# Patient Record
Sex: FEMALE | Race: WHITE | Employment: OTHER | URBAN - METROPOLITAN AREA
[De-identification: names, ages, dates, MRNs, and addresses within clinical notes are randomized per-mention and may not be internally consistent; named-entity substitution may affect disease eponyms.]

---

## 2020-05-11 ENCOUNTER — APPOINTMENT (OUTPATIENT)
Dept: GENERAL RADIOLOGY | Age: 85
DRG: 690 | End: 2020-05-11
Attending: EMERGENCY MEDICINE
Payer: MEDICARE

## 2020-05-11 ENCOUNTER — APPOINTMENT (OUTPATIENT)
Dept: CT IMAGING | Age: 85
DRG: 690 | End: 2020-05-11
Attending: EMERGENCY MEDICINE
Payer: MEDICARE

## 2020-05-11 ENCOUNTER — HOSPITAL ENCOUNTER (INPATIENT)
Age: 85
LOS: 5 days | Discharge: HOME | DRG: 690 | End: 2020-05-18
Attending: EMERGENCY MEDICINE | Admitting: FAMILY MEDICINE
Payer: MEDICARE

## 2020-05-11 DIAGNOSIS — R53.83 FATIGUE, UNSPECIFIED TYPE: ICD-10-CM

## 2020-05-11 DIAGNOSIS — R41.0 DELIRIUM: ICD-10-CM

## 2020-05-11 DIAGNOSIS — R77.8 ELEVATED TROPONIN: Primary | ICD-10-CM

## 2020-05-11 LAB
ALBUMIN SERPL-MCNC: 3.6 G/DL (ref 3.5–5)
ALBUMIN/GLOB SERPL: 0.9 {RATIO} (ref 1.1–2.2)
ALP SERPL-CCNC: 106 U/L (ref 45–117)
ALT SERPL-CCNC: 48 U/L (ref 12–78)
AMMONIA PLAS-SCNC: 20 UMOL/L
ANION GAP SERPL CALC-SCNC: 5 MMOL/L (ref 5–15)
APPEARANCE UR: CLEAR
APTT PPP: 25.2 SEC (ref 22.1–32)
ARTERIAL PATENCY WRIST A: YES
AST SERPL-CCNC: 50 U/L (ref 15–37)
BACTERIA URNS QL MICRO: ABNORMAL /HPF
BASE EXCESS BLD CALC-SCNC: 0 MMOL/L
BASOPHILS # BLD: 0 K/UL (ref 0–0.1)
BASOPHILS NFR BLD: 1 % (ref 0–1)
BDY SITE: ABNORMAL
BILIRUB SERPL-MCNC: 0.3 MG/DL (ref 0.2–1)
BILIRUB UR QL: NEGATIVE
BUN SERPL-MCNC: 27 MG/DL (ref 6–20)
BUN/CREAT SERPL: 25 (ref 12–20)
CA-I BLD-SCNC: 1.2 MMOL/L (ref 1.12–1.32)
CALCIUM SERPL-MCNC: 8.8 MG/DL (ref 8.5–10.1)
CHLORIDE SERPL-SCNC: 108 MMOL/L (ref 97–108)
CO2 SERPL-SCNC: 27 MMOL/L (ref 21–32)
COLOR UR: ABNORMAL
COMMENT, HOLDF: NORMAL
COMMENT, HOLDF: NORMAL
CREAT SERPL-MCNC: 1.08 MG/DL (ref 0.55–1.02)
D DIMER PPP FEU-MCNC: 0.67 MG/L FEU (ref 0–0.65)
DIFFERENTIAL METHOD BLD: NORMAL
EOSINOPHIL # BLD: 0.3 K/UL (ref 0–0.4)
EOSINOPHIL NFR BLD: 3 % (ref 0–7)
EPITH CASTS URNS QL MICRO: ABNORMAL /LPF
ERYTHROCYTE [DISTWIDTH] IN BLOOD BY AUTOMATED COUNT: 12.7 % (ref 11.5–14.5)
FERRITIN SERPL-MCNC: 66 NG/ML (ref 26–388)
FIBRINOGEN PPP-MCNC: 320 MG/DL (ref 200–475)
GAS FLOW.O2 O2 DELIVERY SYS: ABNORMAL L/MIN
GLOBULIN SER CALC-MCNC: 4.2 G/DL (ref 2–4)
GLUCOSE SERPL-MCNC: 93 MG/DL (ref 65–100)
GLUCOSE UR STRIP.AUTO-MCNC: NEGATIVE MG/DL
HCO3 BLD-SCNC: 25.2 MMOL/L (ref 22–26)
HCT VFR BLD AUTO: 38.9 % (ref 35–47)
HGB BLD-MCNC: 12 G/DL (ref 11.5–16)
HGB UR QL STRIP: ABNORMAL
HYALINE CASTS URNS QL MICRO: ABNORMAL /LPF (ref 0–5)
IMM GRANULOCYTES # BLD AUTO: 0 K/UL (ref 0–0.04)
IMM GRANULOCYTES NFR BLD AUTO: 0 % (ref 0–0.5)
INR PPP: 1 (ref 0.9–1.1)
KETONES UR QL STRIP.AUTO: NEGATIVE MG/DL
LDH SERPL L TO P-CCNC: 406 U/L (ref 81–246)
LEUKOCYTE ESTERASE UR QL STRIP.AUTO: NEGATIVE
LYMPHOCYTES # BLD: 1.6 K/UL (ref 0.8–3.5)
LYMPHOCYTES NFR BLD: 21 % (ref 12–49)
MCH RBC QN AUTO: 28.4 PG (ref 26–34)
MCHC RBC AUTO-ENTMCNC: 30.8 G/DL (ref 30–36.5)
MCV RBC AUTO: 92.2 FL (ref 80–99)
MONOCYTES # BLD: 0.7 K/UL (ref 0–1)
MONOCYTES NFR BLD: 9 % (ref 5–13)
NEUTS SEG # BLD: 5 K/UL (ref 1.8–8)
NEUTS SEG NFR BLD: 66 % (ref 32–75)
NITRITE UR QL STRIP.AUTO: POSITIVE
NRBC # BLD: 0 K/UL (ref 0–0.01)
NRBC BLD-RTO: 0 PER 100 WBC
O2/TOTAL GAS SETTING VFR VENT: 21 %
PCO2 BLD: 44.2 MMHG (ref 35–45)
PH BLD: 7.36 [PH] (ref 7.35–7.45)
PH UR STRIP: 5 [PH] (ref 5–8)
PLATELET # BLD AUTO: 209 K/UL (ref 150–400)
PMV BLD AUTO: 10.1 FL (ref 8.9–12.9)
PO2 BLD: 75 MMHG (ref 80–100)
POTASSIUM SERPL-SCNC: 4 MMOL/L (ref 3.5–5.1)
PROCALCITONIN SERPL-MCNC: <0.05 NG/ML
PROT SERPL-MCNC: 7.8 G/DL (ref 6.4–8.2)
PROT UR STRIP-MCNC: NEGATIVE MG/DL
PROTHROMBIN TIME: 10.3 SEC (ref 9–11.1)
RBC # BLD AUTO: 4.22 M/UL (ref 3.8–5.2)
RBC #/AREA URNS HPF: ABNORMAL /HPF (ref 0–5)
SAMPLES BEING HELD,HOLD: NORMAL
SAMPLES BEING HELD,HOLD: NORMAL
SAO2 % BLD: 94 % (ref 92–97)
SODIUM SERPL-SCNC: 140 MMOL/L (ref 136–145)
SP GR UR REFRACTOMETRY: 1.01 (ref 1–1.03)
SPECIMEN TYPE: ABNORMAL
THERAPEUTIC RANGE,PTTT: NORMAL SECS (ref 58–77)
TOTAL RESP. RATE, ITRR: 19
TROPONIN I SERPL-MCNC: 0.11 NG/ML
TROPONIN I SERPL-MCNC: 0.14 NG/ML
UR CULT HOLD, URHOLD: NORMAL
UROBILINOGEN UR QL STRIP.AUTO: 0.2 EU/DL (ref 0.2–1)
WBC # BLD AUTO: 7.6 K/UL (ref 3.6–11)
WBC URNS QL MICRO: ABNORMAL /HPF (ref 0–4)

## 2020-05-11 PROCEDURE — 85730 THROMBOPLASTIN TIME PARTIAL: CPT

## 2020-05-11 PROCEDURE — 87635 SARS-COV-2 COVID-19 AMP PRB: CPT

## 2020-05-11 PROCEDURE — 99285 EMERGENCY DEPT VISIT HI MDM: CPT

## 2020-05-11 PROCEDURE — 70450 CT HEAD/BRAIN W/O DYE: CPT

## 2020-05-11 PROCEDURE — 84145 PROCALCITONIN (PCT): CPT

## 2020-05-11 PROCEDURE — 81001 URINALYSIS AUTO W/SCOPE: CPT

## 2020-05-11 PROCEDURE — 82803 BLOOD GASES ANY COMBINATION: CPT

## 2020-05-11 PROCEDURE — 83615 LACTATE (LD) (LDH) ENZYME: CPT

## 2020-05-11 PROCEDURE — 96372 THER/PROPH/DIAG INJ SC/IM: CPT

## 2020-05-11 PROCEDURE — 80053 COMPREHEN METABOLIC PANEL: CPT

## 2020-05-11 PROCEDURE — 82140 ASSAY OF AMMONIA: CPT

## 2020-05-11 PROCEDURE — 74011000250 HC RX REV CODE- 250: Performed by: EMERGENCY MEDICINE

## 2020-05-11 PROCEDURE — 74011250636 HC RX REV CODE- 250/636: Performed by: NURSE PRACTITIONER

## 2020-05-11 PROCEDURE — 85610 PROTHROMBIN TIME: CPT

## 2020-05-11 PROCEDURE — 84484 ASSAY OF TROPONIN QUANT: CPT

## 2020-05-11 PROCEDURE — 71046 X-RAY EXAM CHEST 2 VIEWS: CPT

## 2020-05-11 PROCEDURE — 93005 ELECTROCARDIOGRAM TRACING: CPT

## 2020-05-11 PROCEDURE — 36600 WITHDRAWAL OF ARTERIAL BLOOD: CPT

## 2020-05-11 PROCEDURE — 74011250636 HC RX REV CODE- 250/636: Performed by: EMERGENCY MEDICINE

## 2020-05-11 PROCEDURE — 99218 HC RM OBSERVATION: CPT

## 2020-05-11 PROCEDURE — 85384 FIBRINOGEN ACTIVITY: CPT

## 2020-05-11 PROCEDURE — 77030019905 HC CATH URETH INTMIT MDII -A

## 2020-05-11 PROCEDURE — 85379 FIBRIN DEGRADATION QUANT: CPT

## 2020-05-11 PROCEDURE — 36415 COLL VENOUS BLD VENIPUNCTURE: CPT

## 2020-05-11 PROCEDURE — 82728 ASSAY OF FERRITIN: CPT

## 2020-05-11 PROCEDURE — 85025 COMPLETE CBC W/AUTO DIFF WBC: CPT

## 2020-05-11 RX ORDER — SODIUM CHLORIDE 0.9 % (FLUSH) 0.9 %
5-40 SYRINGE (ML) INJECTION EVERY 8 HOURS
Status: DISCONTINUED | OUTPATIENT
Start: 2020-05-11 | End: 2020-05-18 | Stop reason: HOSPADM

## 2020-05-11 RX ORDER — SODIUM CHLORIDE 0.9 % (FLUSH) 0.9 %
5-40 SYRINGE (ML) INJECTION AS NEEDED
Status: DISCONTINUED | OUTPATIENT
Start: 2020-05-11 | End: 2020-05-18 | Stop reason: HOSPADM

## 2020-05-11 RX ORDER — ACETAMINOPHEN 650 MG/1
650 SUPPOSITORY RECTAL
Status: DISCONTINUED | OUTPATIENT
Start: 2020-05-11 | End: 2020-05-18 | Stop reason: HOSPADM

## 2020-05-11 RX ORDER — SODIUM CHLORIDE, SODIUM LACTATE, POTASSIUM CHLORIDE, CALCIUM CHLORIDE 600; 310; 30; 20 MG/100ML; MG/100ML; MG/100ML; MG/100ML
50 INJECTION, SOLUTION INTRAVENOUS CONTINUOUS
Status: DISCONTINUED | OUTPATIENT
Start: 2020-05-11 | End: 2020-05-13

## 2020-05-11 RX ORDER — ACETAMINOPHEN 325 MG/1
650 TABLET ORAL
Status: DISCONTINUED | OUTPATIENT
Start: 2020-05-11 | End: 2020-05-18 | Stop reason: HOSPADM

## 2020-05-11 RX ORDER — QUETIAPINE FUMARATE 25 MG/1
25 TABLET, FILM COATED ORAL
Status: DISCONTINUED | OUTPATIENT
Start: 2020-05-11 | End: 2020-05-11

## 2020-05-11 RX ORDER — ENOXAPARIN SODIUM 100 MG/ML
30 INJECTION SUBCUTANEOUS EVERY 12 HOURS
Status: DISCONTINUED | OUTPATIENT
Start: 2020-05-11 | End: 2020-05-12

## 2020-05-11 RX ORDER — ACETAMINOPHEN 325 MG/1
650 TABLET ORAL
Status: DISCONTINUED | OUTPATIENT
Start: 2020-05-11 | End: 2020-05-12 | Stop reason: SDUPTHER

## 2020-05-11 RX ADMIN — WATER 10 MG: 1 INJECTION INTRAMUSCULAR; INTRAVENOUS; SUBCUTANEOUS at 15:54

## 2020-05-11 RX ADMIN — ENOXAPARIN SODIUM 30 MG: 40 INJECTION SUBCUTANEOUS at 23:46

## 2020-05-11 RX ADMIN — SODIUM CHLORIDE, SODIUM LACTATE, POTASSIUM CHLORIDE, AND CALCIUM CHLORIDE 75 ML/HR: 600; 310; 30; 20 INJECTION, SOLUTION INTRAVENOUS at 20:44

## 2020-05-11 RX ADMIN — Medication 10 ML: at 22:00

## 2020-05-11 NOTE — H&P
[address] [facility]  History and Physical    Primary Care Provider: Unknown, Provider  Date of Service:  5/11/2020    Subjective:     Shivam Lawler is a 82 y.o. female medical history of CAD and hypertension who apparently has had mentation issues over the past few weeks that have waxed and waned. She apparently has gotten worse over the last 24 hours. She is been acting \"bizarre\" as reported by 1 of her neighbors. She lives at [facility] which is apparently some sort of nursing home. She was found to have elevated troponin of 0.11 in the ED. EMS reports that she was ANO x4. She was agitated and trying to leave in the ED and so she was given Geodon. O2 sats 95% on room air. She is afebrile. CBC normal limits. Chemistry shows elevated creatinine 1.08, slightly elevated AST 50. No previous records to compare. Patient received Geodon in the ED and is very lethargic. She opens eyes to her name and touch and mumbles words but just states that she is tired. Attempted to call the patient's daughter Gerard William at [phone], however, there was no answer. Unable to obtain review of systems, past medical history, family history, med list at this time due to patient mentation and inability to contact patient's daughter. Per ED doctor, he spoke with patient's daughter earlier who stated patient would not want to be intubated but may want CPR and that she would look for the patient's advanced directive. Since we are unable to discuss with patient and cannot get in touch with the patient's daughter, patient will be full code at this time. Review of Systems:    Unable to obtain d/t patient mentation    Past Medical History:   Diagnosis Date    CAD (coronary artery disease)     Hypertension       History reviewed. No pertinent surgical history.   Prior to Admission medications    Not on File     Allergies   Allergen Reactions    Sulfa (Sulfonamide Antibiotics) Unknown (comments)      History reviewed. No pertinent family history. SOCIAL HISTORY:  Patient resides at nursing home. Patient ambulates independently  Smoking history: None  Alcohol history: None      Objective:       Physical Exam:   Visit Vitals  /71   Pulse 65   Temp 98.5 °F (36.9 °C)   Resp 16   SpO2 95%     General:  lethargic, cooperative, no distress, appears stated age, obese   Head:  Normocephalic, without obvious abnormality, atraumatic. Eyes:  Conjunctivae/corneas clear. Pupils equal, round, reactive to light. Extraocular movements intact. Lungs:   Diminished throughout   Heart:  Regular rate and rhythm, S1, S2 normal, no murmur, click, rub, or gallop. Abdomen:   Soft, non-tender. Bowel sounds normal. No masses. No organomegaly. Extremities: Extremities normal, atraumatic, no cyanosis or edema. Pulses: 2+ and symmetric all extremities. Skin: Skin color, texture, turgor normal. No rashes or lesions.    Lymph nodes: Cervical, supraclavicular, and axillary nodes normal.   Neurologic: RAKESH d/t mentation; clear speech when she does answer, CALDERÓN spontaneously       ECG: Normal sinus rhythm   Septal infarct , age undetermined   No previous ECGs available     Data Review:   Recent Results (from the past 24 hour(s))   EKG, 12 LEAD, INITIAL    Collection Time: 05/11/20  1:19 PM   Result Value Ref Range    Ventricular Rate 69 BPM    Atrial Rate 69 BPM    P-R Interval 160 ms    QRS Duration 78 ms    Q-T Interval 396 ms    QTC Calculation (Bezet) 424 ms    Calculated P Axis 7 degrees    Calculated R Axis 9 degrees    Calculated T Axis 40 degrees    Diagnosis       Normal sinus rhythm  Septal infarct , age undetermined  No previous ECGs available     CBC WITH AUTOMATED DIFF    Collection Time: 05/11/20  1:28 PM   Result Value Ref Range    WBC 7.6 3.6 - 11.0 K/uL    RBC 4.22 3.80 - 5.20 M/uL    HGB 12.0 11.5 - 16.0 g/dL    HCT 38.9 35.0 - 47.0 %    MCV 92.2 80.0 - 99.0 FL    MCH 28.4 26.0 - 34.0 PG MCHC 30.8 30.0 - 36.5 g/dL    RDW 12.7 11.5 - 14.5 %    PLATELET 915 183 - 990 K/uL    MPV 10.1 8.9 - 12.9 FL    NRBC 0.0 0  WBC    ABSOLUTE NRBC 0.00 0.00 - 0.01 K/uL    NEUTROPHILS 66 32 - 75 %    LYMPHOCYTES 21 12 - 49 %    MONOCYTES 9 5 - 13 %    EOSINOPHILS 3 0 - 7 %    BASOPHILS 1 0 - 1 %    IMMATURE GRANULOCYTES 0 0.0 - 0.5 %    ABS. NEUTROPHILS 5.0 1.8 - 8.0 K/UL    ABS. LYMPHOCYTES 1.6 0.8 - 3.5 K/UL    ABS. MONOCYTES 0.7 0.0 - 1.0 K/UL    ABS. EOSINOPHILS 0.3 0.0 - 0.4 K/UL    ABS. BASOPHILS 0.0 0.0 - 0.1 K/UL    ABS. IMM. GRANS. 0.0 0.00 - 0.04 K/UL    DF AUTOMATED     METABOLIC PANEL, COMPREHENSIVE    Collection Time: 05/11/20  1:28 PM   Result Value Ref Range    Sodium 140 136 - 145 mmol/L    Potassium 4.0 3.5 - 5.1 mmol/L    Chloride 108 97 - 108 mmol/L    CO2 27 21 - 32 mmol/L    Anion gap 5 5 - 15 mmol/L    Glucose 93 65 - 100 mg/dL    BUN 27 (H) 6 - 20 MG/DL    Creatinine 1.08 (H) 0.55 - 1.02 MG/DL    BUN/Creatinine ratio 25 (H) 12 - 20      GFR est AA 59 (L) >60 ml/min/1.73m2    GFR est non-AA 48 (L) >60 ml/min/1.73m2    Calcium 8.8 8.5 - 10.1 MG/DL    Bilirubin, total 0.3 0.2 - 1.0 MG/DL    ALT (SGPT) 48 12 - 78 U/L    AST (SGOT) 50 (H) 15 - 37 U/L    Alk. phosphatase 106 45 - 117 U/L    Protein, total 7.8 6.4 - 8.2 g/dL    Albumin 3.6 3.5 - 5.0 g/dL    Globulin 4.2 (H) 2.0 - 4.0 g/dL    A-G Ratio 0.9 (L) 1.1 - 2.2     SAMPLES BEING HELD    Collection Time: 05/11/20  1:28 PM   Result Value Ref Range    SAMPLES BEING HELD 1RED 1BLU     COMMENT        Add-on orders for these samples will be processed based on acceptable specimen integrity and analyte stability, which may vary by analyte.    TROPONIN I    Collection Time: 05/11/20  1:28 PM   Result Value Ref Range    Troponin-I, Qt. 0.11 (H) <0.05 ng/mL   URINALYSIS W/MICROSCOPIC    Collection Time: 05/11/20  5:28 PM   Result Value Ref Range    Color YELLOW/STRAW      Appearance CLEAR CLEAR      Specific gravity 1.014 1.003 - 1.030      pH (UA) 5.0 5.0 - 8.0      Protein Negative NEG mg/dL    Glucose Negative NEG mg/dL    Ketone Negative NEG mg/dL    Bilirubin Negative NEG      Blood MODERATE (A) NEG      Urobilinogen 0.2 0.2 - 1.0 EU/dL    Nitrites Positive (A) NEG      Leukocyte Esterase Negative NEG      WBC 0-4 0 - 4 /hpf    RBC  0 - 5 /hpf    Epithelial cells FEW FEW /lpf    Bacteria 4+ (A) NEG /hpf    Hyaline cast 0-2 0 - 5 /lpf   URINE CULTURE HOLD SAMPLE    Collection Time: 05/11/20  5:28 PM   Result Value Ref Range    Urine culture hold        Urine on hold in Microbiology dept for 2 days. If unpreserved urine is submitted, it cannot be used for addtional testing after 24 hours, recollection will be required. Chest x-ray: pending    CT Results  (Last 48 hours)               05/11/20 1648  CT HEAD WO CONT Final result    Impression:  IMPRESSION: No acute intracranial hemorrhage, mass or infarct. Narrative:  INDICATION: confusion. Fatigue. Exam: Noncontrast CT of the brain is performed with 5 mm collimation. CT dose reduction was achieved with the use of the standardized protocol   tailored for this examination and automatic exposure control for dose   modulation. Adaptive statistical iterative reconstruction (ASIR) was utilized. FINDINGS: There is age-appropriate diffuse cortical atrophy. There is no acute   intracranial hemorrhage, mass, mass effect or herniation. Ventricular system is   normal. The gray-white matter differentiation is well-preserved. The mastoid air   cells are well pneumatized.  The visualized paranasal sinuses are normal.                   Assessment:     Active Problems:    Elevated troponin (5/11/2020)      Hypertension ()      CAD (coronary artery disease) ()      Elevated serum creatinine ()      Elevated AST (SGOT) ()        Plan:     AMS  -Unclear source  -Head CT negative for acute processes  -CXR pending; will also send UA/culture, ammonia level, ABG  -COVID workup d/t AMS and living in nursing home    Elevated troponin: hx CAD  -in setting of hx of CAD; home med list not yet available  -Denies CP  -Trend troponins  -Echo  -Lovenox for [facility] per covid recs  -Consult cardiology    Elevated creatinine  -Suspect d/t dehydration  -IVF  -Recheck in AM    Elevated AST  -Suspect d/t dehydration  -IVF  -Recheck in AM    COVID PUI  -O2 sats 95%  -Resides in nursing/group  azul    Attempted to call daughter, however, there was no answer.  Daughter's name is Jayne Woody and her phone number is [phone]    DVTppx: Lovenox  Gippx: NA  Code Status: Full code  Diet: Cardiac  Activity: OOB to chair TID and PRN  Discharge: TBD         Signed By: Almon Gosselin, NP     May 11, 2020

## 2020-05-11 NOTE — ED TRIAGE NOTES
Pt arrives via squad from [facility] with AMS, pt only is only c/o feeling tired, denies sob, denies fever or cough, denies pain, denies n/v, pt stated she did not asleep last night

## 2020-05-11 NOTE — ED NOTES
1500 Pt refusing cardiac monitor. Pt refusing to lay on stretcher. Pt refusing to provide urine sample    1505 Pt walking around department. Pt refusing to sit on stretcher. 1515 Pt still walking around department. Pt daughter on phone but pt refusing to follow instructions.      0 Pt assisted onto stretcher by staff

## 2020-05-11 NOTE — ED PROVIDER NOTES
HPI       82y F with hx of HTN and CAD here with concern for fatigue. I spoke with her daughter on the phone who says that for the past week or so she's been intermittently confused. Seemed worse today so nursing home called her and asked to send to the ED. Pt will not provide any information to me - says she is \"fine. \" No other history available at this time. Past Medical History:   Diagnosis Date    CAD (coronary artery disease)     Hypertension        History reviewed. No pertinent surgical history. History reviewed. No pertinent family history.     Social History     Socioeconomic History    Marital status: SINGLE     Spouse name: Not on file    Number of children: Not on file    Years of education: Not on file    Highest education level: Not on file   Occupational History    Not on file   Social Needs    Financial resource strain: Not on file    Food insecurity     Worry: Not on file     Inability: Not on file    Transportation needs     Medical: Not on file     Non-medical: Not on file   Tobacco Use    Smoking status: Not on file   Substance and Sexual Activity    Alcohol use: Not on file    Drug use: Not on file    Sexual activity: Not on file   Lifestyle    Physical activity     Days per week: Not on file     Minutes per session: Not on file    Stress: Not on file   Relationships    Social connections     Talks on phone: Not on file     Gets together: Not on file     Attends Je[employer]'s witness service: Not on file     Active member of club or organization: Not on file     Attends meetings of clubs or organizations: Not on file     Relationship status: Not on file    Intimate partner violence     Fear of current or ex partner: Not on file     Emotionally abused: Not on file     Physically abused: Not on file     Forced sexual activity: Not on file   Other Topics Concern    Not on file   Social History Narrative    Not on file         ALLERGIES: Sulfa (sulfonamide antibiotics)    Review of Systems   Review of Systems   Constitutional: (-) weight loss. HEENT: (-) stiff neck   Eyes: (-) discharge. Respiratory: (-) cough. Cardiovascular: (-) syncope. Gastrointestinal: (-) blood in stool. Genitourinary: (-) hematuria. Musculoskeletal: (-) myalgias. Neurological: (-) seizure. Skin: (-) petechiae  Lymph/Immunologic: (-) enlarged lymph nodes  All other systems reviewed and are negative. Vitals:    05/11/20 1237   BP: 133/71   Pulse: 65   Resp: 16   Temp: 98.5 °F (36.9 °C)   SpO2: 95%            Physical Exam Nursing note and vitals reviewed. Constitutional: oriented to person, place, and time. appears elderly and frail. Head: Normocephalic and atraumatic. Sclera anicteric  Nose: No rhinorrhea  Mouth/Throat: Oropharynx is clear and moist. Pharynx normal  Eyes: Conjunctivae are normal. Pupils are equal, round, and reactive to light. Right eye exhibits no discharge. Left eye exhibits no discharge. Neck: Painless normal range of motion. Neck supple. No LAD. Cardiovascular: Normal rate, regular rhythm, normal heart sounds and intact distal pulses. Exam reveals no gallop and no friction rub. No murmur heard. Pulmonary/Chest:  No respiratory distress. No wheezes. No rales. No rhonchi. No increased work of breathing. No accessory muscle use. Good air exchange throughout. Abdominal: soft, non-tender, no rebound or guarding. No hepatosplenomegaly. Normal bowel sounds throughout. Back: no tenderness to palpation, no deformities, no CVA tenderness  Extremities/Musculoskeletal: Normal range of motion. no tenderness. No edema. Distal extremities are neurovasc intact. Lymphadenopathy:   No adenopathy. Neurological:  Moves all ext on her own. Answers some questions appropriately but not others. Skin: Skin is warm and dry. No rash noted. No pallor. MDM 82y F here with confusion. Unclear etiology. Troponin is elevated. Will need admission. Spoke with daughter who is POA who agrees. Procedures      Hospitalist Conchita Serve for Admission  5:20 PM    ED Room Number: G/HG  Patient Name and age:  Elfredia Cuff 82 y.o.  female  Working Diagnosis:   1. Elevated troponin    2. Fatigue, unspecified type    3. Delirium        COVID-19 Suspicion:  no    Code Status:  Partial Code  Readmission: no  Isolation Requirements:  no  Recommended Level of Care:  telemetry  Department:[facility] - 21   Other:  82y F from nursing home with AMS and fatigue. Per daughter, has not been acting herself for a week or so but worse in past 24h. Troponin is 0.11. ECG ok. CT head ok. Labs largely ok. Urine is the only outstanding thing at this point (just obtained by straight cath) was agitated here and kept trying to leave. More calm after 10mg IM geodon. Daughter is Vandana Shukla ([phone]) who is her POA. Says she has to look thru her advanced directives but thinks she wants CPR but would not want to be intubated.

## 2020-05-12 LAB
ALBUMIN SERPL-MCNC: 2.9 G/DL (ref 3.5–5)
ALBUMIN/GLOB SERPL: 0.9 {RATIO} (ref 1.1–2.2)
ALP SERPL-CCNC: 89 U/L (ref 45–117)
ALT SERPL-CCNC: 39 U/L (ref 12–78)
ANION GAP SERPL CALC-SCNC: 3 MMOL/L (ref 5–15)
AST SERPL-CCNC: 36 U/L (ref 15–37)
ATRIAL RATE: 69 BPM
BASOPHILS # BLD: 0 K/UL (ref 0–0.1)
BASOPHILS NFR BLD: 1 % (ref 0–1)
BILIRUB SERPL-MCNC: 0.3 MG/DL (ref 0.2–1)
BUN SERPL-MCNC: 27 MG/DL (ref 6–20)
BUN/CREAT SERPL: 26 (ref 12–20)
CALCIUM SERPL-MCNC: 8.2 MG/DL (ref 8.5–10.1)
CALCULATED P AXIS, ECG09: 7 DEGREES
CALCULATED R AXIS, ECG10: 9 DEGREES
CALCULATED T AXIS, ECG11: 40 DEGREES
CHLORIDE SERPL-SCNC: 109 MMOL/L (ref 97–108)
CO2 SERPL-SCNC: 28 MMOL/L (ref 21–32)
COMMENT, HOLDF: NORMAL
CREAT SERPL-MCNC: 1.02 MG/DL (ref 0.55–1.02)
DIAGNOSIS, 93000: NORMAL
DIFFERENTIAL METHOD BLD: ABNORMAL
EOSINOPHIL # BLD: 0.2 K/UL (ref 0–0.4)
EOSINOPHIL NFR BLD: 3 % (ref 0–7)
ERYTHROCYTE [DISTWIDTH] IN BLOOD BY AUTOMATED COUNT: 12.7 % (ref 11.5–14.5)
GLOBULIN SER CALC-MCNC: 3.4 G/DL (ref 2–4)
GLUCOSE SERPL-MCNC: 132 MG/DL (ref 65–100)
HCT VFR BLD AUTO: 33.3 % (ref 35–47)
HGB BLD-MCNC: 10.4 G/DL (ref 11.5–16)
IMM GRANULOCYTES # BLD AUTO: 0 K/UL (ref 0–0.04)
IMM GRANULOCYTES NFR BLD AUTO: 0 % (ref 0–0.5)
LYMPHOCYTES # BLD: 1.2 K/UL (ref 0.8–3.5)
LYMPHOCYTES NFR BLD: 19 % (ref 12–49)
MCH RBC QN AUTO: 28.9 PG (ref 26–34)
MCHC RBC AUTO-ENTMCNC: 31.2 G/DL (ref 30–36.5)
MCV RBC AUTO: 92.5 FL (ref 80–99)
MONOCYTES # BLD: 0.6 K/UL (ref 0–1)
MONOCYTES NFR BLD: 9 % (ref 5–13)
NEUTS SEG # BLD: 4.4 K/UL (ref 1.8–8)
NEUTS SEG NFR BLD: 68 % (ref 32–75)
NRBC # BLD: 0 K/UL (ref 0–0.01)
NRBC BLD-RTO: 0 PER 100 WBC
P-R INTERVAL, ECG05: 160 MS
PLATELET # BLD AUTO: 197 K/UL (ref 150–400)
PMV BLD AUTO: 9.7 FL (ref 8.9–12.9)
POTASSIUM SERPL-SCNC: 3.9 MMOL/L (ref 3.5–5.1)
PROT SERPL-MCNC: 6.3 G/DL (ref 6.4–8.2)
Q-T INTERVAL, ECG07: 396 MS
QRS DURATION, ECG06: 78 MS
QTC CALCULATION (BEZET), ECG08: 424 MS
RBC # BLD AUTO: 3.6 M/UL (ref 3.8–5.2)
SAMPLES BEING HELD,HOLD: NORMAL
SARS-COV-2, COV2: NOT DETECTED
SODIUM SERPL-SCNC: 140 MMOL/L (ref 136–145)
SPECIMEN SOURCE, FCOV2M: NORMAL
TROPONIN I SERPL-MCNC: 0.13 NG/ML
TROPONIN I SERPL-MCNC: 0.14 NG/ML
VENTRICULAR RATE, ECG03: 69 BPM
WBC # BLD AUTO: 6.4 K/UL (ref 3.6–11)

## 2020-05-12 PROCEDURE — 87186 SC STD MICRODIL/AGAR DIL: CPT

## 2020-05-12 PROCEDURE — 80053 COMPREHEN METABOLIC PANEL: CPT

## 2020-05-12 PROCEDURE — 99218 HC RM OBSERVATION: CPT

## 2020-05-12 PROCEDURE — 74011250637 HC RX REV CODE- 250/637: Performed by: NURSE PRACTITIONER

## 2020-05-12 PROCEDURE — 74011250636 HC RX REV CODE- 250/636: Performed by: NURSE PRACTITIONER

## 2020-05-12 PROCEDURE — 87086 URINE CULTURE/COLONY COUNT: CPT

## 2020-05-12 PROCEDURE — 36415 COLL VENOUS BLD VENIPUNCTURE: CPT

## 2020-05-12 PROCEDURE — 74011250637 HC RX REV CODE- 250/637: Performed by: SPECIALIST

## 2020-05-12 PROCEDURE — 85025 COMPLETE CBC W/AUTO DIFF WBC: CPT

## 2020-05-12 PROCEDURE — 96372 THER/PROPH/DIAG INJ SC/IM: CPT

## 2020-05-12 PROCEDURE — 84484 ASSAY OF TROPONIN QUANT: CPT

## 2020-05-12 PROCEDURE — 87077 CULTURE AEROBIC IDENTIFY: CPT

## 2020-05-12 RX ORDER — ALPRAZOLAM 0.25 MG/1
0.12 TABLET ORAL
Status: DISCONTINUED | OUTPATIENT
Start: 2020-05-12 | End: 2020-05-13

## 2020-05-12 RX ORDER — VALSARTAN AND HYDROCHLOROTHIAZIDE 80; 12.5 MG/1; MG/1
0.5 TABLET, FILM COATED ORAL DAILY
COMMUNITY

## 2020-05-12 RX ORDER — METOPROLOL TARTRATE 50 MG/1
50 TABLET ORAL 2 TIMES DAILY
Status: DISCONTINUED | OUTPATIENT
Start: 2020-05-12 | End: 2020-05-12

## 2020-05-12 RX ORDER — ALENDRONATE SODIUM 70 MG/1
70 TABLET ORAL 2 TIMES WEEKLY
COMMUNITY

## 2020-05-12 RX ORDER — EZETIMIBE 10 MG/1
10 TABLET ORAL DAILY
Status: DISCONTINUED | OUTPATIENT
Start: 2020-05-13 | End: 2020-05-18 | Stop reason: HOSPADM

## 2020-05-12 RX ORDER — DOCUSATE SODIUM 100 MG/1
100 CAPSULE, LIQUID FILLED ORAL
COMMUNITY

## 2020-05-12 RX ORDER — ATORVASTATIN CALCIUM 40 MG/1
80 TABLET, FILM COATED ORAL DAILY
Status: DISCONTINUED | OUTPATIENT
Start: 2020-05-13 | End: 2020-05-18 | Stop reason: HOSPADM

## 2020-05-12 RX ORDER — POLYETHYLENE GLYCOL 400 AND PROPYLENE GLYCOL 4; 3 MG/ML; MG/ML
1 SOLUTION/ DROPS OPHTHALMIC AS NEEDED
COMMUNITY

## 2020-05-12 RX ORDER — METOPROLOL TARTRATE 25 MG/1
25 TABLET, FILM COATED ORAL 2 TIMES DAILY
Status: DISCONTINUED | OUTPATIENT
Start: 2020-05-12 | End: 2020-05-12 | Stop reason: SDUPTHER

## 2020-05-12 RX ORDER — ATORVASTATIN CALCIUM 80 MG/1
80 TABLET, FILM COATED ORAL
COMMUNITY

## 2020-05-12 RX ORDER — MIRTAZAPINE 7.5 MG/1
7.5 TABLET, FILM COATED ORAL
COMMUNITY

## 2020-05-12 RX ORDER — METOPROLOL TARTRATE 25 MG/1
25 TABLET, FILM COATED ORAL 2 TIMES DAILY
COMMUNITY

## 2020-05-12 RX ORDER — ALPRAZOLAM 0.25 MG/1
0.12 TABLET ORAL
COMMUNITY

## 2020-05-12 RX ORDER — EZETIMIBE 10 MG/1
10 TABLET ORAL DAILY
COMMUNITY

## 2020-05-12 RX ORDER — GUAIFENESIN 100 MG/5ML
81 LIQUID (ML) ORAL DAILY
COMMUNITY

## 2020-05-12 RX ORDER — CHOLECALCIFEROL (VITAMIN D3) 125 MCG
2000 CAPSULE ORAL DAILY
COMMUNITY

## 2020-05-12 RX ORDER — ESCITALOPRAM OXALATE 10 MG/1
15 TABLET ORAL DAILY
COMMUNITY
End: 2020-05-18

## 2020-05-12 RX ORDER — METOPROLOL TARTRATE 25 MG/1
25 TABLET, FILM COATED ORAL 2 TIMES DAILY
Status: DISCONTINUED | OUTPATIENT
Start: 2020-05-12 | End: 2020-05-18 | Stop reason: HOSPADM

## 2020-05-12 RX ORDER — ENOXAPARIN SODIUM 100 MG/ML
40 INJECTION SUBCUTANEOUS EVERY 12 HOURS
Status: DISCONTINUED | OUTPATIENT
Start: 2020-05-12 | End: 2020-05-13

## 2020-05-12 RX ORDER — GUAIFENESIN 100 MG/5ML
81 LIQUID (ML) ORAL DAILY
Status: DISCONTINUED | OUTPATIENT
Start: 2020-05-13 | End: 2020-05-18 | Stop reason: HOSPADM

## 2020-05-12 RX ORDER — CHOLECALCIFEROL (VITAMIN D3) 125 MCG
100 CAPSULE ORAL DAILY
COMMUNITY

## 2020-05-12 RX ADMIN — METOPROLOL TARTRATE 50 MG: 50 TABLET, FILM COATED ORAL at 01:31

## 2020-05-12 RX ADMIN — METOPROLOL TARTRATE 50 MG: 50 TABLET, FILM COATED ORAL at 08:56

## 2020-05-12 RX ADMIN — ENOXAPARIN SODIUM 30 MG: 40 INJECTION SUBCUTANEOUS at 11:39

## 2020-05-12 RX ADMIN — METOPROLOL TARTRATE 25 MG: 25 TABLET, FILM COATED ORAL at 17:36

## 2020-05-12 RX ADMIN — SODIUM CHLORIDE, SODIUM LACTATE, POTASSIUM CHLORIDE, AND CALCIUM CHLORIDE 75 ML/HR: 600; 310; 30; 20 INJECTION, SOLUTION INTRAVENOUS at 11:39

## 2020-05-12 RX ADMIN — Medication 10 ML: at 06:30

## 2020-05-12 RX ADMIN — ALPRAZOLAM 0.12 MG: 0.25 TABLET ORAL at 22:50

## 2020-05-12 RX ADMIN — Medication 10 ML: at 17:36

## 2020-05-12 NOTE — ACP (ADVANCE CARE PLANNING)
Advance Care Planning     Advance Care Planning Clinical Specialist  Conversation Note      Date of ACP Conversation: 5/11/2020    [employer] with:  Patient with capacity. ACP Clinical Specialist: Corbin Mcdaniel, DANIELA    *When Decision Maker makes decisions on behalf of the incapacitated patient: Decision Maker is asked to consider and make decisions based on patient values, known preferences, or best interests. Health Care Decision Maker:    Current Designated Health Care Decision Maker:   (If there is a valid [place] named in the [facility] Makers\" box in the ACP activity, but it is not visible above, be sure to open that field and then select the health care decision maker relationship (ie \"primary\") in the blank space to the right of the name.) Validate  this information as still accurate & up-to-date; edit [place] field as needed.)    Note: Assess and validate information in current ACP documents, as indicated. If no Decision Maker listed above or available through scanned documents, then:    If no Authorized Decision Maker has previously been identified, then patient chooses [place]:  \"Who would you like to name as your primary health care decision-maker? \"    Name: Ami Form  Relationship: Daughter Phone [phone]. \"Can this person be reached easily? \" Yes  \"Who would you like to name as your back-up decision maker? \"   Name: Any of my other 4 children    Note: If the relationship of these Decision-Makers to the patient does NOT follow your state's Next of Kin hierarchy, recommend that patient complete ACP document that meets state-specific requirements to allow them to act on the patient's behalf when appropriate. Care Preferences    Ventilation:   \"If you were in your present state of health and suddenly became very ill and were unable to breathe on your own, what would your preference be about the use of a ventilator (breathing machine) if it were available to you? \"      If patient would desire the use of a ventilator (breathing machine), answer \"yes\", if not \"no\":{ YES    \"If your health worsens and it becomes clear that your chance of recovery is unlikely, what would your preference be about the use of a ventilator (breathing machine) if it were available to you? \"     Would the patient desire the use of a ventilator (breathing machine)? YES      Resuscitation  \"CPR works best to restart the heart when there is a sudden event, like a heart attack, in someone who is otherwise healthy. Unfortunately, CPR does not typically restart the heart for people who have serious health conditions or who are very sick. \"    \"In the event your heart stopped as a result of an underlying serious health condition, would you want attempts to be made to restart your heart (answer \"yes\" for attempt to resuscitate) or would you prefer a natural death (answer \"no\" for do not attempt to resuscitate)? \" YES    NOTE: If the patient has a valid advance directive AND now provides care preference(s) that are inconsistent with that prior directive, advise the patient to consider either: creating a new advance directive that complies with state-specific requirements; or, if that is not possible, orally revoking that prior directive in accordance with state-specific requirements, which must be documented in the EHR. [] Yes  [x] No   Educated Patient / Karian Ga regarding differences between Advance Directives and portable DNR orders.     Length of ACP Conversation in minutes:      Conversation Outcomes:  [x] ACP discussion completed  [] Existing advance directive reviewed with patient; no changes to patient's previously recorded wishes   [] New Advance Directive completed   [] Portable Do Not Rescitate prepared for Provider review and signature  [] POLST/POST/MOLST/MOST prepared for Provider review and signature      Follow-up plan:    [x] Schedule follow-up conversation to continue planning  [] Referred individual to Provider for additional questions/concerns   [] Advised patient/agent/surrogate to review completed ACP document and update if needed with changes in condition, patient preferences or care setting     [] This note routed to one or more involved healthcare providers      Daughter to bring patient's AMD here to the hospital.

## 2020-05-12 NOTE — CONSULTS
Consult Note      Assessment:    Patient Active Problem List   Diagnosis Code    Elevated troponin R79.89    Hypertension I10    CAD (coronary artery disease) I25.10    Elevated serum creatinine R79.89    Elevated AST (SGOT) R74.0       Recommendations:    Echocardiogram, when feasible. Restart meds for BP. Suspect minimal troponin elevation without variation in this setting is of minor consequence. Yazan Morales MD  [phone]  Chris Yuan is a 82 y.o. female who presented 5/11/2020 with complaints of confusion and bizarre behavior. .  The symptoms began approximately 2 days ago. She has unclear history of cardiac disease including CAD, noted in chart but unable to obtain confirmation. Examination by the attending physician suggested the possibility of CAD. At present the patient has slightly improved since initial presentation. Therapy thus far has included O2. Cardiology has been consulted to assist in the management of this patient.     Current Facility-Administered Medications   Medication Dose Route Frequency    ALPRAZolam (XANAX) tablet 0.125 mg  0.125 mg Oral QHS PRN    metoprolol tartrate (LOPRESSOR) tablet 25 mg  25 mg Oral BID    enoxaparin (LOVENOX) injection 30 mg  30 mg SubCUTAneous Q12H    acetaminophen (TYLENOL) tablet 650 mg  650 mg Oral Q6H PRN    Or    acetaminophen (TYLENOL) suppository 650 mg  650 mg Rectal Q6H PRN    lactated Ringers infusion  50 mL/hr IntraVENous CONTINUOUS    sodium chloride (NS) flush 5-40 mL  5-40 mL IntraVENous Q8H    sodium chloride (NS) flush 5-40 mL  5-40 mL IntraVENous PRN    acetaminophen (TYLENOL) tablet 650 mg  650 mg Oral Q4H PRN     Past Medical History:   Diagnosis Date    CAD (coronary artery disease)     Hypertension      Patient Active Problem List   Diagnosis Code    Elevated troponin R79.89    Hypertension I10    CAD (coronary artery disease) I25.10    Elevated serum creatinine R79.89    Elevated AST (SGOT) R74.0 Allergies   Allergen Reactions    Sulfa (Sulfonamide Antibiotics) Unknown (comments)     Social History     Tobacco Use    Smoking status: Not on file   Substance Use Topics    Alcohol use: Not on file    Drug use: Not on file     History reviewed. No pertinent family history. Review of Symptoms:  Review of systems not obtained due to patient factors. Objective:      Visit Vitals  /85 (BP 1 Location: Right arm, BP Patient Position: At rest)   Pulse 60   Temp 99.2 °F (37.3 °C)   Resp 20   Ht 5' 4\" (1.626 m)   Wt 90.5 kg (199 lb 8.3 oz)   SpO2 93%   BMI 34.25 kg/m²      Physical Exam    Patient not examined due to unclear Covid status, chart reviewed, spoke with nurse. Cardiographics    Telemetry: normal sinus rhythm  ECG: normal sinus rhythm  Echocardiogram: Not done    Labs:   Recent Results (from the past 24 hour(s))   EKG, 12 LEAD, INITIAL    Collection Time: 05/11/20  1:19 PM   Result Value Ref Range    Ventricular Rate 69 BPM    Atrial Rate 69 BPM    P-R Interval 160 ms    QRS Duration 78 ms    Q-T Interval 396 ms    QTC Calculation (Bezet) 424 ms    Calculated P Axis 7 degrees    Calculated R Axis 9 degrees    Calculated T Axis 40 degrees    Diagnosis       Normal sinus rhythm  Septal infarct , age undetermined  No previous ECGs available     CBC WITH AUTOMATED DIFF    Collection Time: 05/11/20  1:28 PM   Result Value Ref Range    WBC 7.6 3.6 - 11.0 K/uL    RBC 4.22 3.80 - 5.20 M/uL    HGB 12.0 11.5 - 16.0 g/dL    HCT 38.9 35.0 - 47.0 %    MCV 92.2 80.0 - 99.0 FL    MCH 28.4 26.0 - 34.0 PG    MCHC 30.8 30.0 - 36.5 g/dL    RDW 12.7 11.5 - 14.5 %    PLATELET 538 260 - 219 K/uL    MPV 10.1 8.9 - 12.9 FL    NRBC 0.0 0  WBC    ABSOLUTE NRBC 0.00 0.00 - 0.01 K/uL    NEUTROPHILS 66 32 - 75 %    LYMPHOCYTES 21 12 - 49 %    MONOCYTES 9 5 - 13 %    EOSINOPHILS 3 0 - 7 %    BASOPHILS 1 0 - 1 %    IMMATURE GRANULOCYTES 0 0.0 - 0.5 %    ABS. NEUTROPHILS 5.0 1.8 - 8.0 K/UL    ABS.  LYMPHOCYTES 1.6 0.8 - 3.5 K/UL    ABS. MONOCYTES 0.7 0.0 - 1.0 K/UL    ABS. EOSINOPHILS 0.3 0.0 - 0.4 K/UL    ABS. BASOPHILS 0.0 0.0 - 0.1 K/UL    ABS. IMM. GRANS. 0.0 0.00 - 0.04 K/UL    DF AUTOMATED     METABOLIC PANEL, COMPREHENSIVE    Collection Time: 05/11/20  1:28 PM   Result Value Ref Range    Sodium 140 136 - 145 mmol/L    Potassium 4.0 3.5 - 5.1 mmol/L    Chloride 108 97 - 108 mmol/L    CO2 27 21 - 32 mmol/L    Anion gap 5 5 - 15 mmol/L    Glucose 93 65 - 100 mg/dL    BUN 27 (H) 6 - 20 MG/DL    Creatinine 1.08 (H) 0.55 - 1.02 MG/DL    BUN/Creatinine ratio 25 (H) 12 - 20      GFR est AA 59 (L) >60 ml/min/1.73m2    GFR est non-AA 48 (L) >60 ml/min/1.73m2    Calcium 8.8 8.5 - 10.1 MG/DL    Bilirubin, total 0.3 0.2 - 1.0 MG/DL    ALT (SGPT) 48 12 - 78 U/L    AST (SGOT) 50 (H) 15 - 37 U/L    Alk. phosphatase 106 45 - 117 U/L    Protein, total 7.8 6.4 - 8.2 g/dL    Albumin 3.6 3.5 - 5.0 g/dL    Globulin 4.2 (H) 2.0 - 4.0 g/dL    A-G Ratio 0.9 (L) 1.1 - 2.2     SAMPLES BEING HELD    Collection Time: 05/11/20  1:28 PM   Result Value Ref Range    SAMPLES BEING HELD 1RED 1BLU     COMMENT        Add-on orders for these samples will be processed based on acceptable specimen integrity and analyte stability, which may vary by analyte.    TROPONIN I    Collection Time: 05/11/20  1:28 PM   Result Value Ref Range    Troponin-I, Qt. 0.11 (H) <0.05 ng/mL   PROCALCITONIN    Collection Time: 05/11/20  1:28 PM   Result Value Ref Range    Procalcitonin <0.05 ng/mL   LD    Collection Time: 05/11/20  1:28 PM   Result Value Ref Range     (H) 81 - 246 U/L   FERRITIN    Collection Time: 05/11/20  1:28 PM   Result Value Ref Range    Ferritin 66 26 - 388 NG/ML   URINALYSIS W/MICROSCOPIC    Collection Time: 05/11/20  5:28 PM   Result Value Ref Range    Color YELLOW/STRAW      Appearance CLEAR CLEAR      Specific gravity 1.014 1.003 - 1.030      pH (UA) 5.0 5.0 - 8.0      Protein Negative NEG mg/dL    Glucose Negative NEG mg/dL    Ketone Negative NEG mg/dL    Bilirubin Negative NEG      Blood MODERATE (A) NEG      Urobilinogen 0.2 0.2 - 1.0 EU/dL    Nitrites Positive (A) NEG      Leukocyte Esterase Negative NEG      WBC 0-4 0 - 4 /hpf    RBC  0 - 5 /hpf    Epithelial cells FEW FEW /lpf    Bacteria 4+ (A) NEG /hpf    Hyaline cast 0-2 0 - 5 /lpf   URINE CULTURE HOLD SAMPLE    Collection Time: 05/11/20  5:28 PM   Result Value Ref Range    Urine culture hold        Urine on hold in Microbiology dept for 2 days. If unpreserved urine is submitted, it cannot be used for addtional testing after 24 hours, recollection will be required. POC EG7    Collection Time: 05/11/20  7:00 PM   Result Value Ref Range    Calcium, ionized (POC) 1.20 1. 12 - 1.32 mmol/L    FIO2 (POC) 21 %    pH (POC) 7.363 7.35 - 7.45      pCO2 (POC) 44.2 35.0 - 45.0 MMHG    pO2 (POC) 75 (L) 80 - 100 MMHG    HCO3 (POC) 25.2 22 - 26 MMOL/L    Base excess (POC) 0 mmol/L    sO2 (POC) 94 92 - 97 %    Site LEFT RADIAL      Device: ROOM AIR      Allens test (POC) YES      Specimen type (POC) ARTERIAL      Total resp.  rate 19     AMMONIA    Collection Time: 05/11/20  7:17 PM   Result Value Ref Range    Ammonia 20 <32 UMOL/L   TROPONIN I    Collection Time: 05/11/20  7:17 PM   Result Value Ref Range    Troponin-I, Qt. 0.14 (H) <0.05 ng/mL   D DIMER    Collection Time: 05/11/20  7:17 PM   Result Value Ref Range    D-dimer 0.67 (H) 0.00 - 0.65 mg/L FEU   FIBRINOGEN    Collection Time: 05/11/20  7:17 PM   Result Value Ref Range    Fibrinogen 320 200 - 475 mg/dL   PROTHROMBIN TIME + INR    Collection Time: 05/11/20  7:17 PM   Result Value Ref Range    INR 1.0 0.9 - 1.1      Prothrombin time 10.3 9.0 - 11.1 sec   PTT    Collection Time: 05/11/20  7:17 PM   Result Value Ref Range    aPTT 25.2 22.1 - 32.0 sec    aPTT, therapeutic range     58.0 - 77.0 SECS   SAMPLES BEING HELD    Collection Time: 05/11/20  7:17 PM   Result Value Ref Range    SAMPLES BEING HELD 1 RED, 1 BLUE     COMMENT Add-on orders for these samples will be processed based on acceptable specimen integrity and analyte stability, which may vary by analyte. SARS-COV-2    Collection Time: 05/11/20 11:44 PM   Result Value Ref Range    Specimen source Nasopharyngeal      SARS-CoV-2 PENDING    CBC WITH AUTOMATED DIFF    Collection Time: 05/12/20  1:40 AM   Result Value Ref Range    WBC 6.4 3.6 - 11.0 K/uL    RBC 3.60 (L) 3.80 - 5.20 M/uL    HGB 10.4 (L) 11.5 - 16.0 g/dL    HCT 33.3 (L) 35.0 - 47.0 %    MCV 92.5 80.0 - 99.0 FL    MCH 28.9 26.0 - 34.0 PG    MCHC 31.2 30.0 - 36.5 g/dL    RDW 12.7 11.5 - 14.5 %    PLATELET 917 838 - 854 K/uL    MPV 9.7 8.9 - 12.9 FL    NRBC 0.0 0  WBC    ABSOLUTE NRBC 0.00 0.00 - 0.01 K/uL    NEUTROPHILS 68 32 - 75 %    LYMPHOCYTES 19 12 - 49 %    MONOCYTES 9 5 - 13 %    EOSINOPHILS 3 0 - 7 %    BASOPHILS 1 0 - 1 %    IMMATURE GRANULOCYTES 0 0.0 - 0.5 %    ABS. NEUTROPHILS 4.4 1.8 - 8.0 K/UL    ABS. LYMPHOCYTES 1.2 0.8 - 3.5 K/UL    ABS. MONOCYTES 0.6 0.0 - 1.0 K/UL    ABS. EOSINOPHILS 0.2 0.0 - 0.4 K/UL    ABS. BASOPHILS 0.0 0.0 - 0.1 K/UL    ABS. IMM. GRANS. 0.0 0.00 - 0.04 K/UL    DF AUTOMATED     TROPONIN I    Collection Time: 05/12/20  1:40 AM   Result Value Ref Range    Troponin-I, Qt. 0.13 (H) <9.73 ng/mL   METABOLIC PANEL, COMPREHENSIVE    Collection Time: 05/12/20  1:40 AM   Result Value Ref Range    Sodium 140 136 - 145 mmol/L    Potassium 3.9 3.5 - 5.1 mmol/L    Chloride 109 (H) 97 - 108 mmol/L    CO2 28 21 - 32 mmol/L    Anion gap 3 (L) 5 - 15 mmol/L    Glucose 132 (H) 65 - 100 mg/dL    BUN 27 (H) 6 - 20 MG/DL    Creatinine 1.02 0.55 - 1.02 MG/DL    BUN/Creatinine ratio 26 (H) 12 - 20      GFR est AA >60 >60 ml/min/1.73m2    GFR est non-AA 52 (L) >60 ml/min/1.73m2    Calcium 8.2 (L) 8.5 - 10.1 MG/DL    Bilirubin, total 0.3 0.2 - 1.0 MG/DL    ALT (SGPT) 39 12 - 78 U/L    AST (SGOT) 36 15 - 37 U/L    Alk.  phosphatase 89 45 - 117 U/L    Protein, total 6.3 (L) 6.4 - 8.2 g/dL    Albumin 2.9 (L) 3.5 - 5.0 g/dL    Globulin 3.4 2.0 - 4.0 g/dL    A-G Ratio 0.9 (L) 1.1 - 2.2     TROPONIN I    Collection Time: 05/12/20  8:06 AM   Result Value Ref Range    Troponin-I, Qt. 0.14 (H) <0.05 ng/mL       Josefina Guerrier MD

## 2020-05-12 NOTE — PROGRESS NOTES
Chart reviewed, suspect minimal troponin elevation without variation is of no immediate concern, needs echo when possible, full note to follow

## 2020-05-12 NOTE — PROGRESS NOTES
1920: TRANSFER - IN REPORT:    Verbal report received from Chai Edis, [address] [facility] (name) on Fili Vizcaino  being received from ED (unit) for routine progression of care      Report consisted of patients Situation, Background, Assessment and   Recommendations(SBAR). Information from the following report(s) SBAR, Kardex, ED Summary, Procedure Summary, Intake/Output, MAR, Recent Results and Cardiac Rhythm NSR was reviewed with the receiving nurse. Opportunity for questions and clarification was provided. Assessment completed upon patients arrival to unit and care assumed. Jonelle Salazar RN and Lawyer Deanna RN performed a dual skin assessment on this patient and nothing of note as assessed. 0030: DAVID Jc on unit, discussed pt's elevated blood pressures. Pt has bag of medications at bedside, DAVID Jc inputted some. Med rec still needed in AM with pharmacy. Will admin metoprolol 50mg BID beginning now, 0100.

## 2020-05-12 NOTE — PROGRESS NOTES
Problem: Falls - Risk of  Goal: *Absence of Falls  Description: Document Jackeline Bhatti Fall Risk and appropriate interventions in the flowsheet.   Outcome: Progressing Towards Goal  Note: Fall Risk Interventions:  Mobility Interventions: Bed/chair exit alarm    Mentation Interventions: Adequate sleep, hydration, pain control    Medication Interventions: Patient to call before getting OOB, Teach patient to arise slowly    Elimination Interventions: Patient to call for help with toileting needs, Stay With Me (per policy)              Problem: General Medical Care Plan  Goal: *Vital signs within specified parameters  Outcome: Progressing Towards Goal  Goal: *Labs within defined limits  Outcome: Progressing Towards Goal  Goal: *Absence of infection signs and symptoms  Outcome: Progressing Towards Goal  Goal: *Optimal pain control at patient's stated goal  Outcome: Progressing Towards Goal  Goal: *Skin integrity maintained  Outcome: Progressing Towards Goal  Goal: *Fluid volume balance  Outcome: Progressing Towards Goal  Goal: *Optimize nutritional status  Outcome: Progressing Towards Goal  Goal: *Progressive mobility and function (eg: ADL's)  Outcome: Progressing Towards Goal     Problem: Patient Education: Go to Patient Education Activity  Goal: Patient/Family Education  Outcome: Progressing Towards Goal

## 2020-05-12 NOTE — PROGRESS NOTES
Problem: Falls - Risk of  Goal: *Absence of Falls  Description: Document Trino Granger Fall Risk and appropriate interventions in the flowsheet. Outcome: Progressing Towards Goal  Note: Fall Risk Interventions:  Mobility Interventions: Bed/chair exit alarm, Communicate number of staff needed for ambulation/transfer, Patient to call before getting OOB, Utilize walker, cane, or other assistive device    Mentation Interventions: Adequate sleep, hydration, pain control, Evaluate medications/consider consulting pharmacy, Increase mobility, More frequent rounding, Reorient patient, Room close to nurse's station, Toileting rounds, Update white board    Medication Interventions: Assess postural VS orthostatic hypotension, Patient to call before getting OOB, Evaluate medications/consider consulting pharmacy, Teach patient to arise slowly, Bed/chair exit alarm    Elimination Interventions: Bed/chair exit alarm, Call light in reach, Elevated toilet seat, Patient to call for help with toileting needs, Stay With Me (per policy), Toilet paper/wipes in reach, Toileting schedule/hourly rounds    Problem: General Medical Care Plan  Goal: *Labs within defined limits  Outcome: Progressing Towards Goal  Note: Troponin both trending down. Results for Sharyn Montano (MRN [id number]) as of 5/12/2020 04:55   Ref. Range 5/11/2020 13:28 5/11/2020 19:17 5/12/2020 01:40   Troponin-I, Qt. Latest Ref Range: <0.05 ng/mL 0.11 (H) 0.14 (H) 0.13 (H)     Bedside shift change report given to Yoli Grossman RN (oncoming nurse) by David Jc RN (offgoing nurse). Report included the following information SBAR, Kardex, ED Summary, Procedure Summary, Intake/Output, MAR, Recent Results and Cardiac Rhythm NSR/SB.

## 2020-05-12 NOTE — PROGRESS NOTES
TRANSITION OF CARE  Disposition--Return home to independent functioning. Resides at [place] [address]. Transportation--  Family    Note: CM advised [employer] in [address] that patient has a BC/RedT Medicare Supplement. Lobo Norris will update [employer] who can access the BC/RedT data base for identifying information. Care Management Interventions  PCP Verified by CM: Yes(JACLYN Stiles MD)  Transition of Care Consult (CM Consult): Other(none)  Discharge Durable Medical Equipment: No  Physical Therapy Consult: No  Occupational Therapy Consult: No  Speech Therapy Consult: No  Current Support Network: Lives Alone  The Plan for Transition of Care is Related to the Following Treatment Goals : return to independent functioning. Discharge Location  Discharge Placement: Home    Reason for Admission:   AMS-resolved             Elevated troponin, Creat, AST              R/O COVID 19- results pending. RUR Score:            N/A           Plan for utilizing home health:      None    PCP: First and Last name:  Susan Smith MD   Name of Practice:       [facility]/ [phone]   Are you a current patient: Yes    Approximate date of last visit:                     Current Advanced Directive/Advance Care Plan:  YES/ Daughter to bring to the hospital.                         Transition of Care Plan:    CM spoke with patient via phone due to Joe isolation. Patient lives alone at [place] [address] where she has been for about 2 years. Patient has 5 children with daughter Sumi Toledo and son Sudeep Crouch who live locally with daughter Sumi Toledo identified as the primary family contact ([phone]) . Patient reports good family /social support. Patient is ambulatory and expects return to independent functioning.  Patient confirmed PCP (CM updated chart), health insurance (has BC/RedT Medicare Supplement with card not available) , and prescription coverage. Transport at discharge will be provided by family. No discharge planning needs presented at this time. CM spoke with daughter Marija Monson  And update was given. Marija Ear will try to get patient's AMD to the hospital to be entered in the chart. Patient is currently on COVID isolation. CM explained forms via phone. CM gave patient the Patient Guide to Observation and Outpatient Care with delivery by staff nurse. --original to chart and copy to patient. CM gave patient the Medicare Outpatient Observation Notice delivered via staff nurse-- original to chart and copy to patient.

## 2020-05-13 ENCOUNTER — APPOINTMENT (OUTPATIENT)
Dept: NON INVASIVE DIAGNOSTICS | Age: 85
DRG: 690 | End: 2020-05-13
Attending: PHYSICIAN ASSISTANT
Payer: MEDICARE

## 2020-05-13 PROBLEM — N39.0 UTI (URINARY TRACT INFECTION): Status: ACTIVE | Noted: 2020-05-13

## 2020-05-13 PROCEDURE — 99218 HC RM OBSERVATION: CPT

## 2020-05-13 PROCEDURE — 65660000000 HC RM CCU STEPDOWN

## 2020-05-13 PROCEDURE — 74011000250 HC RX REV CODE- 250: Performed by: FAMILY MEDICINE

## 2020-05-13 PROCEDURE — 74011250637 HC RX REV CODE- 250/637: Performed by: HOSPITALIST

## 2020-05-13 PROCEDURE — 74011250636 HC RX REV CODE- 250/636: Performed by: FAMILY MEDICINE

## 2020-05-13 PROCEDURE — 74011250637 HC RX REV CODE- 250/637: Performed by: FAMILY MEDICINE

## 2020-05-13 PROCEDURE — 96372 THER/PROPH/DIAG INJ SC/IM: CPT

## 2020-05-13 PROCEDURE — 74011250637 HC RX REV CODE- 250/637: Performed by: SPECIALIST

## 2020-05-13 PROCEDURE — 94760 N-INVAS EAR/PLS OXIMETRY 1: CPT

## 2020-05-13 RX ORDER — ALPRAZOLAM 0.25 MG/1
0.12 TABLET ORAL
Status: DISCONTINUED | OUTPATIENT
Start: 2020-05-13 | End: 2020-05-15

## 2020-05-13 RX ADMIN — LIDOCAINE HYDROCHLORIDE 1 G: 10 INJECTION, SOLUTION EPIDURAL; INFILTRATION; INTRACAUDAL; PERINEURAL at 09:09

## 2020-05-13 RX ADMIN — ASPIRIN 81 MG 81 MG: 81 TABLET ORAL at 08:57

## 2020-05-13 RX ADMIN — METOPROLOL TARTRATE 25 MG: 25 TABLET, FILM COATED ORAL at 17:27

## 2020-05-13 RX ADMIN — METOPROLOL TARTRATE 25 MG: 25 TABLET, FILM COATED ORAL at 08:57

## 2020-05-13 RX ADMIN — Medication 10 ML: at 14:32

## 2020-05-13 RX ADMIN — EZETIMIBE 10 MG: 10 TABLET ORAL at 08:58

## 2020-05-13 RX ADMIN — ATORVASTATIN CALCIUM 80 MG: 40 TABLET, FILM COATED ORAL at 08:58

## 2020-05-13 RX ADMIN — ALPRAZOLAM 0.12 MG: 0.25 TABLET ORAL at 14:32

## 2020-05-13 NOTE — PROGRESS NOTES
Problem: Falls - Risk of  Goal: *Absence of Falls  Description: Document Hillary Eli Fall Risk and appropriate interventions in the flowsheet.   Outcome: Progressing Towards Goal  Note: Fall Risk Interventions:  Mobility Interventions: Bed/chair exit alarm, Patient to call before getting OOB    Mentation Interventions: Adequate sleep, hydration, pain control    Medication Interventions: Patient to call before getting OOB, Teach patient to arise slowly    Elimination Interventions: Patient to call for help with toileting needs              Problem: Patient Education: Go to Patient Education Activity  Goal: Patient/Family Education  Outcome: Progressing Towards Goal     Problem: General Medical Care Plan  Goal: *Vital signs within specified parameters  Outcome: Progressing Towards Goal  Goal: *Labs within defined limits  Outcome: Progressing Towards Goal  Goal: *Absence of infection signs and symptoms  Outcome: Progressing Towards Goal  Goal: *Optimal pain control at patient's stated goal  Outcome: Progressing Towards Goal  Goal: *Skin integrity maintained  Outcome: Progressing Towards Goal  Goal: *Fluid volume balance  Outcome: Progressing Towards Goal  Goal: *Optimize nutritional status  Outcome: Progressing Towards Goal  Goal: *Anxiety reduced or absent  Outcome: Progressing Towards Goal  Goal: *Progressive mobility and function (eg: ADL's)  Outcome: Progressing Towards Goal     Problem: Patient Education: Go to Patient Education Activity  Goal: Patient/Family Education  Outcome: Progressing Towards Goal

## 2020-05-13 NOTE — PROGRESS NOTES
[facility] Adult  Hospitalist Group                                                                                          Hospitalist Progress Note  Geovanna Robbins MD  Answering service: [phone] OR [phone] from in house phone        Date of Service:  2020  NAME:  Shivam Lawler  :  1935  MRN:  [id number]      Admission Summary:     Shivam Lawler is a 82 y.o. female medical history of CAD and hypertension who apparently has had mentation issues over the past few weeks that have waxed and waned. She apparently has gotten worse over the last 24 hours. She is been acting \"bizarre\" as reported by 1 of her neighbors. She lives at [facility] which is apparently some sort of nursing home. She was found to have elevated troponin of 0.11 in the ED. EMS reports that she was ANO x4. She was agitated and trying to leave in the ED and so she was given Geodon. O2 sats 95% on room air. She is afebrile. CBC normal limits. Chemistry shows elevated creatinine 1.08, slightly elevated AST 50. No previous records to compare.     Interval history / Subjective:   Patient overnight was mildly agitation and pulled out IV  Stable vitals and labs  COVID neg and we are starting her on IV abx tx for UTI  She is confused and refusing meds this am- requiring a sitter in the room     Assessment & Plan:     Acute infectious encephalopathy POA- due to UTI  -given Geodon in ER due to agitation  -Head CT negative for acute processes  PRN xanax ordered  Negative COVID testing  Started on antibiotics for UTI but had to give them IM due to loss of IV and refusal to take po meds     Elevated troponin with hx CAD  --troponin minimally elevated, no chest pain, stable BP and HR  -continue home meds aspirin, metoprolol, lipitor   -Echo ordered, EKG reviewed and unremarkable  -cardiologist consulted and following     Elevated AST  -Suspect due to dehydration  -resolved    Essential HTN  -BP elevated this am, continue metoprolol and monitor BP    HLD- resumed statin med on admission    Code status: Full Code  DVT prophylaxis: Lovenox    Care Plan discussed with: Patient/Family and Nurse  Anticipated Disposition: back to [address] [place] [address]  Anticipated Discharge: 24 hours to 48 hours     Hospital Problems  Date Reviewed: 5/12/2020          Codes Class Noted POA    Elevated troponin ICD-10-CM: R79.89  ICD-9-CM: 790.6  5/11/2020 Yes        Hypertension ICD-10-CM: I10  ICD-9-CM: 401.9  Unknown Yes        CAD (coronary artery disease) ICD-10-CM: I25.10  ICD-9-CM: 414.00  Unknown Yes        Elevated serum creatinine ICD-10-CM: R79.89  ICD-9-CM: 790.99  Unknown Yes        Elevated AST (SGOT) ICD-10-CM: R74.0  ICD-9-CM: 790.4  Unknown Yes                Vital Signs:    Last 24hrs VS reviewed since prior progress note. Most recent are:  Visit Vitals  /76 (BP 1 Location: Left arm, BP Patient Position: At rest)   Pulse 64   Temp 98 °F (36.7 °C)   Resp 16   Ht 5' 4\" (1.626 m)   Wt 88.6 kg (195 lb 5.2 oz)   SpO2 96%   BMI 33.53 kg/m²     Patient Vitals for the past 24 hrs:   Temp Pulse Resp BP SpO2   05/13/20 0314 98 °F (36.7 °C) 64 16 179/76 96 %   05/12/20 2344 97.7 °F (36.5 °C) 62 16 158/77 94 %   05/12/20 2041 99 °F (37.2 °C) 63 18 147/79 95 %   05/12/20 1900 99 °F (37.2 °C) 64 17 113/72 94 %   05/12/20 1504 99.2 °F (37.3 °C) 70 18 131/64 94 %   05/12/20 1135 99.2 °F (37.3 °C) 60 20 140/85 93 %   05/12/20 0741 97.4 °F (36.3 °C) 63 16 153/85 90 %       Intake/Output Summary (Last 24 hours) at 5/13/2020 0732  Last data filed at 5/12/2020 5603  Gross per 24 hour   Intake 240 ml   Output    Net 240 ml        Physical Examination:             Constitutional:  confused, requiring frequent redirection, constantly climbing out of bed    ENT:  Oral mucosa dry. Resp:  Decreased bronchial breath sound bilaterally but clear.  No wheezing/   CV:  Regular rhythm, normal rate, no murmurs,     GI:  Soft, non distended, non tender. normoactive bowel sounds, no hepatosplenomegaly     Musculoskeletal:  No edema     Neurologic:  Moves all extremities. CN II-XII reviewed, confused, a bit lethargic, moving very slowly     Skin:  Good turgor, no rashes or ulcers       Data Review:    Review and/or order of clinical lab test  Review and/or order of tests in the radiology section of [facility]  Review and/or order of tests in the medicine section of [facility]      Labs:     Recent Labs     05/12/20  0140 05/11/20  1328   WBC 6.4 7.6   HGB 10.4* 12.0   HCT 33.3* 38.9    209     Recent Labs     05/12/20  0140 05/11/20  1328    140   K 3.9 4.0   * 108   CO2 28 27   BUN 27* 27*   CREA 1.02 1.08*   * 93   CA 8.2* 8.8     Recent Labs     05/12/20  0140 05/11/20  1328   SGOT 36 50*   ALT 39 48   AP 89 106   TBILI 0.3 0.3   TP 6.3* 7.8   ALB 2.9* 3.6   GLOB 3.4 4.2*     Recent Labs     05/11/20  1917   INR 1.0   PTP 10.3   APTT 25.2      Recent Labs     05/11/20  1328   FERR 66      No results found for: FOL, RBCF   No results for input(s): PH, PCO2, PO2 in the last 72 hours.   Recent Labs     05/12/20  0806 05/12/20 0140 05/11/20 1917   TROIQ 0.14* 0.13* 0.14*     No results found for: CHOL, CHOLX, CHLST, CHOLV, HDL, HDLP, LDL, LDLC, DLDLP, TGLX, TRIGL, TRIGP, CHHD, CHHDX  No results found for: [facility]  Lab Results   Component Value Date/Time    Color YELLOW/STRAW 05/11/2020 05:28 PM    Appearance CLEAR 05/11/2020 05:28 PM    Specific gravity 1.014 05/11/2020 05:28 PM    pH (UA) 5.0 05/11/2020 05:28 PM    Protein Negative 05/11/2020 05:28 PM    Glucose Negative 05/11/2020 05:28 PM    Ketone Negative 05/11/2020 05:28 PM    Bilirubin Negative 05/11/2020 05:28 PM    Urobilinogen 0.2 05/11/2020 05:28 PM    Nitrites Positive (A) 05/11/2020 05:28 PM    Leukocyte Esterase Negative 05/11/2020 05:28 PM    Epithelial cells FEW 05/11/2020 05:28 PM    Bacteria 4+ (A) 05/11/2020 05:28 PM    WBC 0-4 05/11/2020 05:28 PM    RBC  05/11/2020 05:28 PM         Medications Reviewed:     Current Facility-Administered Medications   Medication Dose Route Frequency    ALPRAZolam (XANAX) tablet 0.125 mg  0.125 mg Oral QHS PRN    metoprolol tartrate (LOPRESSOR) tablet 25 mg  25 mg Oral BID    enoxaparin (LOVENOX) injection 40 mg  40 mg SubCUTAneous Q12H    aspirin chewable tablet 81 mg  81 mg Oral DAILY    ezetimibe (ZETIA) tablet 10 mg  10 mg Oral DAILY    atorvastatin (LIPITOR) tablet 80 mg  80 mg Oral DAILY    acetaminophen (TYLENOL) tablet 650 mg  650 mg Oral Q6H PRN    Or    acetaminophen (TYLENOL) suppository 650 mg  650 mg Rectal Q6H PRN    lactated Ringers infusion  50 mL/hr IntraVENous CONTINUOUS    sodium chloride (NS) flush 5-40 mL  5-40 mL IntraVENous Q8H    sodium chloride (NS) flush 5-40 mL  5-40 mL IntraVENous PRN     ______________________________________________________________________  EXPECTED LENGTH OF STAY: - - -  ACTUAL LENGTH OF STAY:          0                 Nicole Downing MD

## 2020-05-13 NOTE — PHYSICIAN ADVISORY
This patient is at high risk of adverse events and deterioration based on documented clinical data, comorbid conditions and current acute care course. Ms. Omaira Bergman is expected to meet Inpatient Admission status criteria in accordance with CMS regulation Section 43 .3. Specifically, due to medical necessity the patient's stay has exceeded Two Midnights. It is our recommendation that this patient's hospitalization status should be upgraded from  OBSERVATION to INPATIENT status. The final decision of the patient's hospitalization status depends on the attending physician's judgment.

## 2020-05-13 NOTE — PROGRESS NOTES
Cardiology Progress Note            Admit Date: 5/11/2020  Admit Diagnosis: Elevated troponin [R79.89]  UTI (urinary tract infection) [N39.0]  Elevated troponin [R79.89]  Date: 5/13/2020     Time: 11:29 AM    Subjective: Without complaints. She is not oriented. Received walking with staff. Assessment and Plan     1. Flat troponin   - asymptomatic, no changes in EKG   - she refused echo  2. AMS   - unclear source   - per Primary team  3. EVERETT   - resolving with IVF  4. UTI   - UA with 4+ bacteria - Cx? Flat troponin. Asymptomatic. Refused echo. AMS and dementia at baseline. Reviewed admission H/P - Per ED doctor, he spoke with patient's daughter earlier who stated patient would not want to be intubated but may want CPR and that she would look for the patient's advanced directive. Recommend Palliative Care consult to establish CODE status and goal of care. She needs no further cardiac testing at this time. Will see again as needed. Cardiology Attending:Patient seen and examined. I agree with NP assessment and plans. Refuses echo will sign off. Geraldine To MD 5/13/2020 1:16 PM         No results found for: IVIS   Past Medical History:   Diagnosis Date    CAD (coronary artery disease)     Hypertension       Social History     Tobacco Use    Smoking status: Not on file   Substance Use Topics    Alcohol use: Not on file    Drug use: Not on file           Review of Systems:    [] Patient unable to provide secondary to condition    [x] All systems negative, except as checked below.   Constitutional:    []Weight Change  []Fever   []Chills   []Night Sweats  []Fatigue  []Malaise  []____  ENT/Mouth:     []Hearing Changes  []Ear Pain  []Nasal Congestion   []Sinus Pain  []Hoarseness   []Sore throat  []Rhinorrhea  []Swallowing Difficulty  []____  Eyes:    []Eye Pain  []Swelling  []Redness  []Foreign Body  []Discharge  []Vision Changes  []____  Cardiovascular:    []Chest Pain  []SOB  []PND  []PADILLA  []Orthopnea  []Claudication  []Edema   []Palpitations  []____  Respiratory:    []Cough  []Sputum  []Wheezing,  []SOB  []Hemoptysis  []____  Gastrointestinal:    []Nausea  []Vomiting  []Diarrhea  []Constipation  []Pain  []Heartburn  []Anorexia  []Dysphagia  []Hematochezia  []Melena,  []Jaundice  []____  Genitourinary:    []Dysuria  []Urinary Frequency  []Hematuria  []Urinary Incontinence  []Urgency  []Flank Pain  []Hesitancy  []____  Musculoskeletal:    []Arthralgias  []Myalgias  []Joint Swelling  []Joint Stiffness  []Back Pain  []Neck Pain  []____  Skin:    []Skin Lesions  []Pruritis  []Hair Changes  []Skin rashes  []____  Neuro:    []Weakness  []Numbness  []Paresthesias  []Loss of Consciousness  []Syncope   []Dizziness  []Headache  []Coordination Changes  []Recent Falls  []____  Psych:    []Anxiety/Depression  []Insomnia  []Memory Changes  []Violence/Abuse Hx.  []____  Heme/Lymph:    []Bruising  []Bleeding  []Lymphadenopathy  []____  Endocrine:    []Polyuria  []Polydipsia  []Temperature Intolerance  []____         Objective:     Physical Exam:                Visit Vitals  /72 (BP 1 Location: Right arm, BP Patient Position: At rest)   Pulse 69   Temp 98.9 °F (37.2 °C)   Resp 18   Ht 5' 4\" (1.626 m)   Wt 195 lb 5.2 oz (88.6 kg)   SpO2 97%   BMI 33.53 kg/m²        General Appearance:   Frail,alert, and   individual in no acute distress. Ears/Nose/Mouth/Throat:    Hearing grossly normal.         Neck:  Supple. Chest:    Lungs diminished to auscultation bilaterally. Cardiovascular:   Regular rate and rhythm, S1, S2 normal, no murmur. Abdomen:    Soft, non-tender, bowel sounds are active. Extremities:  No edema bilaterally. Skin:  Warm and dry.      Telemetry: normal sinus rhythm     Data Review:    Labs:    Recent Results (from the past 24 hour(s))   SAMPLES BEING HELD    Collection Time: 05/12/20  8:10 PM   Result Value Ref Range SAMPLES BEING HELD 1UA     COMMENT        Add-on orders for these samples will be processed based on acceptable specimen integrity and analyte stability, which may vary by analyte. Radiology:        Current Facility-Administered Medications   Medication Dose Route Frequency    cefTRIAXone (ROCEPHIN) 1 g in lidocaine (PF) (XYLOCAINE) 10 mg/mL (1 %) IM injection  1 g IntraMUSCular DAILY    ALPRAZolam (XANAX) tablet 0.125 mg  0.125 mg Oral QHS PRN    metoprolol tartrate (LOPRESSOR) tablet 25 mg  25 mg Oral BID    aspirin chewable tablet 81 mg  81 mg Oral DAILY    ezetimibe (ZETIA) tablet 10 mg  10 mg Oral DAILY    atorvastatin (LIPITOR) tablet 80 mg  80 mg Oral DAILY    acetaminophen (TYLENOL) tablet 650 mg  650 mg Oral Q6H PRN    Or    acetaminophen (TYLENOL) suppository 650 mg  650 mg Rectal Q6H PRN    sodium chloride (NS) flush 5-40 mL  5-40 mL IntraVENous Q8H    sodium chloride (NS) flush 5-40 mL  5-40 mL IntraVENous PRN       Angelina Chen.  Stanley Huitron MD     [facility] [address] [place] [address], [address] [place] [address] [address] [place] [address] [facility]   [phone]

## 2020-05-13 NOTE — PROGRESS NOTES
5/13/2020 -   EDIN:  - Likely disposition includes return to [facility] with transport via family    - RUR: not available; RRAT: 7  - Patient has had periods of agitation and confusion  - Patient has a sitter  - Patient will need PT and OT consults once mentation clears some  - Patient refused Echo 5/13  - COVID-19 is negative  - ABX continue  CRM: Catherine Zhong, MPH, [address] [place] [address]; Z: [phone]

## 2020-05-13 NOTE — PROGRESS NOTES
0800: patient Refused all lab work    0900: Amaya Refused to take Ceftriaxone IM. Dr Lemos Orn. Im Ceftriaxone with Xylocaine given in gluteal tessy muscle. 0928:Refused ECHO. Informed Dr Marta Olson)  Talked with her Daughter 3 times. Updated patient condition    Bedside and Verbal shift change report given to RAE Paulson (oncoming nurse) by Blair Chino (offgoing nurse). Report included the following information SBAR, Kardex, ED Summary, Procedure Summary, Intake/Output, MAR, Recent Results and Cardiac Rhythm NSR.

## 2020-05-13 NOTE — PROGRESS NOTES
2040  Pt came to floor in recliner, she refused to get out of recliner to transport to room 357. Pt got out of chair and preceded to walk to the door to leave stating she wants to see her family. Multiple attempts to get her to bed she refused and a code atlas was called. Security helped place her back to bed with a bed alarm on. Once everyone left the room she preceded to try and get up OOB. Moved patient closer to nurses station to room 351. Placed a sitter in room pt still not cooperating. Prior to coming to [facility] patient pulled her IV out and refusing to let anyone replace it. 0300 patient awake all night still refusing IV placement and lab draws. Will continue to monitor.

## 2020-05-13 NOTE — PROGRESS NOTES
20:00  Entered room to obtain urine sample and found patient had ripped out IV line. Attempted to insert new IV, PT refused. Pt agitated and confused.

## 2020-05-13 NOTE — PROGRESS NOTES
TRANSFER - OUT REPORT:    Verbal report given to RAE Paulson(name) on Dimitrios Blaise  being transferred to 3(unit) for routine progression of care       Report consisted of patients Situation, Background, Assessment and   Recommendations(SBAR). Information from the following report(s) SBAR, Procedure Summary, Intake/Output, MAR, Recent Results and Cardiac Rhythm NSR was reviewed with the receiving nurse. Lines:   Peripheral IV 05/11/20 Left Antecubital (Active)   Site Assessment Intact;Dry;Drainage (comment) 5/12/2020  5:25 PM   Phlebitis Assessment 0 5/12/2020  5:25 PM   Infiltration Assessment 0 5/12/2020  5:25 PM   Dressing Status Clean, dry, & intact 5/12/2020  5:25 PM   Dressing Type Transparent 5/12/2020  5:25 PM   Hub Color/Line Status Pink; Infusing 5/12/2020  5:25 PM   Action Taken Open ports on tubing capped 5/12/2020  5:25 PM   Alcohol Cap Used Yes 5/12/2020  5:25 PM        Opportunity for questions and clarification was provided.       Patient transported with:  Personal belongings

## 2020-05-13 NOTE — PROGRESS NOTES
Problem: Falls - Risk of  Goal: *Absence of Falls  Description: Document Collin Henning Fall Risk and appropriate interventions in the flowsheet.   Outcome: Progressing Towards Goal  Note: Fall Risk Interventions:  Mobility Interventions: Communicate number of staff needed for ambulation/transfer, Bed/chair exit alarm    Mentation Interventions: Bed/chair exit alarm, Door open when patient unattended, Family/sitter at bedside    Medication Interventions: Bed/chair exit alarm, Evaluate medications/consider consulting pharmacy    Elimination Interventions: Bed/chair exit alarm, Call light in reach, Toileting schedule/hourly rounds

## 2020-05-13 NOTE — PROGRESS NOTES
[facility]  Hospitalist Group                                                                                          Hospitalist Progress Note  Chikis Jackson MD  Answering service: [phone] from in house phone        Date of Service:  2020  NAME:  Silvana Stephens  :  1935  MRN:  [id number]      Admission Summary:     Silvana Stephens is a 82 y.o. female medical history of CAD and hypertension who apparently has had mentation issues over the past few weeks that have waxed and waned. She apparently has gotten worse over the last 24 hours. She is been acting \"bizarre\" as reported by 1 of her neighbors. She lives at [facility] which is apparently some sort of nursing home. She was found to have elevated troponin of 0.11 in the ED. EMS reports that she was ANO x4. She was agitated and trying to leave in the ED and so she was given Geodon. O2 sats 95% on room air. She is afebrile. CBC normal limits. Chemistry shows elevated creatinine 1.08, slightly elevated AST 50. No previous records to compare.     Interval history / Subjective:     She said no fever, cough, shortness of breath or chest pain  She said she has tardive dyskinesis and tried different meds but not helping her     Assessment & Plan:     COVID-19 suspected  -afebrile, leukocytosis, SpO2 sats 95%  -chest x ray very mild left basilar linear scarring.  -Resides in nursing/group  home  -SARS COV-2 test negative, transfer to non COVID unit    Acute metabolic encephalopathy POA  -Head CT negative for acute processes  -UA abnormal, follow up urine cx     Elevated troponin with hx CAD  -no chest pain  -continue aspirin, metoprolol, lipitor   -troponin 0.14, 0.13, 0.14, initial was 0.11  -Echo pending  -cardiologist is consulted     Elevated AST  -Suspect due to dehydration  -resolved    HTN  -BP normal, continue metoprolol and monitor BP    Code status: Full Code  DVT prophylaxis: Lovenox    Care Plan discussed with: Patient/Family and Nurse  Anticipated Disposition: Home w/Family  Anticipated Discharge: 24 hours to 48 hours     Hospital Problems  Date Reviewed: 5/12/2020          Codes Class Noted POA    Elevated troponin ICD-10-CM: R79.89  ICD-9-CM: 790.6  5/11/2020 Yes        Hypertension ICD-10-CM: I10  ICD-9-CM: 401.9  Unknown Yes        CAD (coronary artery disease) ICD-10-CM: I25.10  ICD-9-CM: 414.00  Unknown Yes        Elevated serum creatinine ICD-10-CM: R79.89  ICD-9-CM: 790.99  Unknown Yes        Elevated AST (SGOT) ICD-10-CM: R74.0  ICD-9-CM: 790.4  Unknown Yes                Vital Signs:    Last 24hrs VS reviewed since prior progress note. Most recent are:  Visit Vitals  /79 (BP 1 Location: Right arm, BP Patient Position: At rest)   Pulse 63   Temp 99 °F (37.2 °C)   Resp 18   Ht 5' 4\" (1.626 m)   Wt 90.5 kg (199 lb 8.3 oz)   SpO2 95%   BMI 34.25 kg/m²         Intake/Output Summary (Last 24 hours) at 5/12/2020 2130  Last data filed at 5/12/2020 4783  Gross per 24 hour   Intake 785 ml   Output 250 ml   Net 535 ml        Physical Examination:             Constitutional:  No acute distress, cooperative, pleasant    ENT:  Oral mucosa moist, oropharynx benign. Resp:  Decrease bronchial breath sound bilaterally. No wheezing/rhonchi/rales. No accessory muscle use   CV:  Regular rhythm, normal rate, no murmurs, gallops, rubs    GI:  Soft, non distended, non tender. normoactive bowel sounds, no hepatosplenomegaly     Musculoskeletal:  No edema     Neurologic:  Moves all extremities.   AAOx3, CN II-XII reviewed     Skin:  Good turgor, no rashes or ulcers       Data Review:    Review and/or order of clinical lab test  Review and/or order of tests in the radiology section of CPT  Review and/or order of tests in the medicine section of CPT      Labs:     Recent Labs     05/12/20  0140 05/11/20  1328   WBC 6.4 7.6   HGB 10.4* 12.0   HCT 33.3* 38.9    209     Recent Labs     05/12/20  0140 05/11/20  1328    140   K 3.9 4.0   * 108   CO2 28 27   BUN 27* 27*   CREA 1.02 1.08*   * 93   CA 8.2* 8.8     Recent Labs     05/12/20  0140 05/11/20 1328   SGOT 36 50*   ALT 39 48   AP 89 106   TBILI 0.3 0.3   TP 6.3* 7.8   ALB 2.9* 3.6   GLOB 3.4 4.2*     Recent Labs     05/11/20 1917   INR 1.0   PTP 10.3   APTT 25.2      Recent Labs     05/11/20  1328   FERR 66      No results found for: FOL, RBCF   No results for input(s): PH, PCO2, PO2 in the last 72 hours.   Recent Labs     05/12/20  0806 05/12/20 0140 05/11/20 1917   TROIQ 0.14* 0.13* 0.14*     No results found for: CHOL, CHOLX, CHLST, CHOLV, HDL, HDLP, LDL, LDLC, DLDLP, TGLX, TRIGL, TRIGP, CHHD, CHHDX  No results found for: [place]  Lab Results   Component Value Date/Time    Color YELLOW/STRAW 05/11/2020 05:28 PM    Appearance CLEAR 05/11/2020 05:28 PM    Specific gravity 1.014 05/11/2020 05:28 PM    pH (UA) 5.0 05/11/2020 05:28 PM    Protein Negative 05/11/2020 05:28 PM    Glucose Negative 05/11/2020 05:28 PM    Ketone Negative 05/11/2020 05:28 PM    Bilirubin Negative 05/11/2020 05:28 PM    Urobilinogen 0.2 05/11/2020 05:28 PM    Nitrites Positive (A) 05/11/2020 05:28 PM    Leukocyte Esterase Negative 05/11/2020 05:28 PM    Epithelial cells FEW 05/11/2020 05:28 PM    Bacteria 4+ (A) 05/11/2020 05:28 PM    WBC 0-4 05/11/2020 05:28 PM    RBC  05/11/2020 05:28 PM         Medications Reviewed:     Current Facility-Administered Medications   Medication Dose Route Frequency    ALPRAZolam (XANAX) tablet 0.125 mg  0.125 mg Oral QHS PRN    metoprolol tartrate (LOPRESSOR) tablet 25 mg  25 mg Oral BID    enoxaparin (LOVENOX) injection 40 mg  40 mg SubCUTAneous Q12H    [START ON 5/13/2020] aspirin chewable tablet 81 mg  81 mg Oral DAILY    [START ON 5/13/2020] ezetimibe (ZETIA) tablet 10 mg  10 mg Oral DAILY    [START ON 5/13/2020] atorvastatin (LIPITOR) tablet 80 mg  80 mg Oral DAILY    acetaminophen (TYLENOL) tablet 650 mg  650 mg Oral Q6H PRN    Or    acetaminophen (TYLENOL) suppository 650 mg  650 mg Rectal Q6H PRN    lactated Ringers infusion  50 mL/hr IntraVENous CONTINUOUS    sodium chloride (NS) flush 5-40 mL  5-40 mL IntraVENous Q8H    sodium chloride (NS) flush 5-40 mL  5-40 mL IntraVENous PRN     ______________________________________________________________________  EXPECTED LENGTH OF STAY: - - -  ACTUAL LENGTH OF STAY:          0                 Emil Anne MD

## 2020-05-14 ENCOUNTER — APPOINTMENT (OUTPATIENT)
Dept: NON INVASIVE DIAGNOSTICS | Age: 85
DRG: 690 | End: 2020-05-14
Attending: PHYSICIAN ASSISTANT
Payer: MEDICARE

## 2020-05-14 LAB
AV VELOCITY RATIO: 0.77
ECHO AO ROOT DIAM: 3.44 CM
ECHO AV AREA PEAK VELOCITY: 3.1 CM2
ECHO AV PEAK GRADIENT: 8.4 MMHG
ECHO AV PEAK VELOCITY: 145.26 CM/S
ECHO EST RA PRESSURE: 5 MMHG
ECHO LA AREA 4C: 22.6 CM2
ECHO LA MAJOR AXIS: 2.73 CM
ECHO LA TO AORTIC ROOT RATIO: 0.79
ECHO LA VOL 2C: 85.35 ML (ref 22–52)
ECHO LA VOL 4C: 65.88 ML (ref 22–52)
ECHO LA VOL BP: 82.64 ML (ref 22–52)
ECHO LA VOL/BSA BIPLANE: 42.8 ML/M2 (ref 16–28)
ECHO LA VOLUME INDEX A2C: 44.2 ML/M2 (ref 16–28)
ECHO LA VOLUME INDEX A4C: 34.12 ML/M2 (ref 16–28)
ECHO LV E' LATERAL VELOCITY: 4.23 CM/S
ECHO LV E' SEPTAL VELOCITY: 5.3 CM/S
ECHO LV INTERNAL DIMENSION DIASTOLIC: 4.06 CM (ref 3.9–5.3)
ECHO LV INTERNAL DIMENSION SYSTOLIC: 2.92 CM
ECHO LV IVSD: 1.43 CM (ref 0.6–0.9)
ECHO LV MASS 2D: 198.6 G (ref 67–162)
ECHO LV MASS INDEX 2D: 102.9 G/M2 (ref 43–95)
ECHO LV POSTERIOR WALL DIASTOLIC: 0.98 CM (ref 0.6–0.9)
ECHO LVOT DIAM: 2.25 CM
ECHO LVOT PEAK GRADIENT: 5 MMHG
ECHO LVOT PEAK VELOCITY: 112.14 CM/S
ECHO MV A VELOCITY: 97.53 CM/S
ECHO MV AREA PHT: 3.4 CM2
ECHO MV E DECELERATION TIME (DT): 224.7 MS
ECHO MV E VELOCITY: 58.12 CM/S
ECHO MV E/A RATIO: 0.6
ECHO MV E/E' LATERAL: 13.74
ECHO MV E/E' RATIO (AVERAGED): 12.35
ECHO MV E/E' SEPTAL: 10.97
ECHO MV PRESSURE HALF TIME (PHT): 65.2 MS
ECHO PULMONARY ARTERY SYSTOLIC PRESSURE (PASP): 28.3 MMHG
ECHO PV MAX VELOCITY: 89.78 CM/S
ECHO PV PEAK GRADIENT: 3.2 MMHG
ECHO RIGHT VENTRICULAR SYSTOLIC PRESSURE (RVSP): 28.3 MMHG
ECHO RV INTERNAL DIMENSION: 3.92 CM
ECHO RV TAPSE: 1.62 CM (ref 1.5–2)
ECHO TV REGURGITANT MAX VELOCITY: 241.54 CM/S
ECHO TV REGURGITANT PEAK GRADIENT: 23.3 MMHG
LVFS 2D: 28.18 %
MV DEC SLOPE: 2.59
PULMONARY ARTERY END DIASTOLIC PRESSURE: 11 MMHG
PULMONARY ARTERY MEAN PRESURE: 16.8 MMHG
PV END DIASTOLIC VELOCITY: 1.2 MMHG

## 2020-05-14 PROCEDURE — 74011250637 HC RX REV CODE- 250/637: Performed by: HOSPITALIST

## 2020-05-14 PROCEDURE — 74011250637 HC RX REV CODE- 250/637: Performed by: SPECIALIST

## 2020-05-14 PROCEDURE — 74011000250 HC RX REV CODE- 250: Performed by: FAMILY MEDICINE

## 2020-05-14 PROCEDURE — 65660000000 HC RM CCU STEPDOWN

## 2020-05-14 PROCEDURE — 74011250636 HC RX REV CODE- 250/636: Performed by: FAMILY MEDICINE

## 2020-05-14 PROCEDURE — 93306 TTE W/DOPPLER COMPLETE: CPT

## 2020-05-14 PROCEDURE — 94760 N-INVAS EAR/PLS OXIMETRY 1: CPT

## 2020-05-14 RX ADMIN — ASPIRIN 81 MG 81 MG: 81 TABLET ORAL at 08:20

## 2020-05-14 RX ADMIN — EZETIMIBE 10 MG: 10 TABLET ORAL at 08:20

## 2020-05-14 RX ADMIN — METOPROLOL TARTRATE 25 MG: 25 TABLET, FILM COATED ORAL at 08:20

## 2020-05-14 RX ADMIN — LIDOCAINE HYDROCHLORIDE 1 G: 10 INJECTION, SOLUTION EPIDURAL; INFILTRATION; INTRACAUDAL; PERINEURAL at 08:20

## 2020-05-14 RX ADMIN — ATORVASTATIN CALCIUM 80 MG: 40 TABLET, FILM COATED ORAL at 08:20

## 2020-05-14 RX ADMIN — METOPROLOL TARTRATE 25 MG: 25 TABLET, FILM COATED ORAL at 17:27

## 2020-05-14 NOTE — PROGRESS NOTES
0900: IM Antibiotics given. Patient condition really improve than yesterday. She fallow command. she is more alert and oriented(Times 3) today. 100: She refused ECHO yesterday. ECHO done today. 0930: Switch IM antibiotics to IV. Patient Refused to put new IV. Patient does not have any Peripheral IV since 2 days. She is getting antibiotics once in 24 hours    7:45PM: Bedside and Verbal shift change report given to Ryan Nugent (oncoming nurse) by Randy Sarabia (offgoing nurse). Report included the following information SBAR, Kardex, ED Summary, Procedure Summary, Intake/Output, MAR, Recent Results and Cardiac Rhythm NSR.

## 2020-05-14 NOTE — PROGRESS NOTES
Problem: Falls - Risk of  Goal: *Absence of Falls  Description: Document Ravi Javier Fall Risk and appropriate interventions in the flowsheet.   Outcome: Progressing Towards Goal  Note: Fall Risk Interventions:  Mobility Interventions: Bed/chair exit alarm, Assess mobility with egress test, Communicate number of staff needed for ambulation/transfer    Mentation Interventions: Bed/chair exit alarm, Toileting rounds, Family/sitter at bedside    Medication Interventions: Patient to call before getting OOB, Bed/chair exit alarm    Elimination Interventions: Call light in reach, Toileting schedule/hourly rounds

## 2020-05-14 NOTE — PROGRESS NOTES
Verbal shift change report given to NELLY Guardado RN (oncoming nurse) by AMISH Reyna, RAE (offgoing nurse). Report included the following information SBAR.     5/14/2020 @ 0100: patient refusing to have rn complete assessment, did allow vital signs to be taken. @6717: upon awakening patient for vital signs/labs/assessment, pt confused and upset. Refusing to have vital signs and blood drawn as well as assessment performed, \"I just want to go back to sleep\". Per report, this is patient's baseline and is non-compliant. Will attempt to get blood work during next rounds. @5318: patient continues to refuse lab draws and vital signs. States, \"I might do it later\". @4048: Bedside shift change report given to DEBO Smith RN (oncoming nurse) by NELLY Guardado RN (offgoing nurse). Report included the following information SBAR.

## 2020-05-14 NOTE — PROGRESS NOTES
Problem: Falls - Risk of  Goal: *Absence of Falls  Description: Document Beatrice Cullen Fall Risk and appropriate interventions in the flowsheet.   Outcome: Progressing Towards Goal  Note: Fall Risk Interventions:  Mobility Interventions: Bed/chair exit alarm, Assess mobility with egress test, Communicate number of staff needed for ambulation/transfer    Mentation Interventions: Bed/chair exit alarm, Evaluate medications/consider consulting pharmacy, Family/sitter at bedside    Medication Interventions: Evaluate medications/consider consulting pharmacy    Elimination Interventions: Bed/chair exit alarm, Toileting schedule/hourly rounds              Problem: Patient Education: Go to Patient Education Activity  Goal: Patient/Family Education  Outcome: Progressing Towards Goal     Problem: General Medical Care Plan  Goal: *Vital signs within specified parameters  Outcome: Progressing Towards Goal  Goal: *Labs within defined limits  Outcome: Progressing Towards Goal  Goal: *Absence of infection signs and symptoms  Outcome: Progressing Towards Goal  Goal: *Optimal pain control at patient's stated goal  Outcome: Progressing Towards Goal  Goal: *Skin integrity maintained  Outcome: Progressing Towards Goal  Goal: *Fluid volume balance  Outcome: Progressing Towards Goal  Goal: *Optimize nutritional status  Outcome: Progressing Towards Goal  Goal: *Anxiety reduced or absent  Outcome: Progressing Towards Goal  Goal: *Progressive mobility and function (eg: ADL's)  Outcome: Progressing Towards Goal     Problem: Patient Education: Go to Patient Education Activity  Goal: Patient/Family Education  Outcome: Progressing Towards Goal

## 2020-05-14 NOTE — PROGRESS NOTES
[facility] [facility] Adult  Hospitalist Group                                                                                          Hospitalist Progress Note  Darien De Guzman MD  Answering service: [phone] OR [phone] from in house phone        Date of Service:  2020  NAME:  Marya Kim  :  1935  MRN:  [id number]    Admission Summary:   Marya Kim is a 82 y.o. female medical history of CAD and hypertension who apparently has had mentation issues over the past few weeks that have waxed and waned. She apparently has gotten worse over the last 24 hours. She is been acting \"bizarre\" as reported by 1 of her neighbors. She lives at [facility] which is apparently some sort of nursing home. She was found to have elevated troponin of 0.11 in the ED. EMS reports that she was ANO x4. She was agitated and trying to leave in the ED and so she was given Geodon. O2 sats 95% on room air. She is afebrile. CBC normal limits. Chemistry shows elevated creatinine 1.08, slightly elevated AST 50. No previous records to compare. Interval history / Subjective:   Patient feels ok, appears alert, oriented this morning, will try iv line.  Urine cultures still pending, will get PT/OT eval     Assessment & Plan:     Acute infectious encephalopathy POA- due to UTI- Urine cultures GNR  -given Geodon in ER due to agitation-Head CT negative for acute processes  PRN xanax ordered- Negative COVID testing  Started Abx for UTI but had to give them IM due to loss of IV and refusal to take po meds  - PT/OT eval    Elevated troponin with hx CAD  --troponin minimally elevated, no chest pain, stable BP and HR  -continue home meds aspirin, metoprolol, lipitor   -cardiologist following-Echo ordered, EKG reviewed and unremarkable     Elevated AST -Suspect due to dehydration -resolved  Essential HTN -BP elevated this am, continue metoprolol and monitor BP  HLD- resumed statin med on admission    Code status: Full Code  DVT prophylaxis: Lovenox  Care Plan discussed with: Patient/Family and Nurse  Anticipated Disposition: back to [address] [place] [address]  Anticipated Discharge: 24 hours to 50 hours     Hospital Problems  Date Reviewed: 5/12/2020          Codes Class Noted POA    UTI (urinary tract infection) ICD-10-CM: N39.0  ICD-9-CM: 599.0  5/13/2020 Unknown        Elevated troponin ICD-10-CM: R79.89  ICD-9-CM: 790.6  5/11/2020 Yes        Hypertension ICD-10-CM: G21  ICD-9-CM: 401.9  Unknown Yes        CAD (coronary artery disease) ICD-10-CM: I25.10  ICD-9-CM: 414.00  Unknown Yes        Elevated serum creatinine ICD-10-CM: R79.89  ICD-9-CM: 790.99  Unknown Yes        Elevated AST (SGOT) ICD-10-CM: R74.0  ICD-9-CM: 790.4  Unknown Yes                Vital Signs:    Last 24hrs VS reviewed since prior progress note. Most recent are:  Visit Vitals  /76 (BP 1 Location: Left arm, BP Patient Position: At rest)   Pulse 63   Temp 98.2 °F (36.8 °C)   Resp 16   Ht 5' 4\" (1.626 m)   Wt 88.2 kg (194 lb 7.1 oz)   SpO2 94%   BMI 33.38 kg/m²     Patient Vitals for the past 24 hrs:   Temp Pulse Resp BP SpO2   05/14/20 0810 98.2 °F (36.8 °C) 63 16 132/76 94 %   05/14/20 0349  (!) 56      05/14/20 0107 98 °F (36.7 °C) 75 18 131/84 95 %   05/13/20 1945 97.5 °F (36.4 °C) 72 20 133/84 94 %   05/13/20 1631 98.5 °F (36.9 °C) 83 18 130/85 94 %   05/13/20 1501 96.4 °F (35.8 °C) 95 18 127/73 95 %   05/13/20 1320  75 20 158/89 95 %     No intake or output data in the 24 hours ending 05/14/20 9401     Physical Examination:             Constitutional:  confused, requiring frequent redirection, constantly climbing out of bed        Resp:  Decreased bronchial breath sound bilaterally but clear. No wheezing/   CV:  Regular rhythm, normal rate, no murmurs,     GI:  Soft, non distended, non tender. Musculoskeletal:  No edema     Neurologic:  Moves all extremities.  Follows commands     Skin:  Good turgor, no rashes or ulcers       Data Review:    Review and/or order of clinical lab test  Review and/or order of tests in the radiology section of CPT  Review and/or order of tests in the medicine section of [facility]      Labs:     Recent Labs     05/12/20  0140 05/11/20  1328   WBC 6.4 7.6   HGB 10.4* 12.0   HCT 33.3* 38.9    209     Recent Labs     05/12/20  0140 05/11/20  1328    140   K 3.9 4.0   * 108   CO2 28 27   BUN 27* 27*   CREA 1.02 1.08*   * 93   CA 8.2* 8.8     Recent Labs     05/12/20  0140 05/11/20  1328   SGOT 36 50*   ALT 39 48   AP 89 106   TBILI 0.3 0.3   TP 6.3* 7.8   ALB 2.9* 3.6   GLOB 3.4 4.2*     Recent Labs     05/11/20  1917   INR 1.0   PTP 10.3   APTT 25.2      Recent Labs     05/11/20  1328   FERR 66      No results found for: FOL, RBCF   No results for input(s): PH, PCO2, PO2 in the last 72 hours.   Recent Labs     05/12/20  0806 05/12/20  0140 05/11/20  1917   TROIQ 0.14* 0.13* 0.14*     No results found for: CHOL, CHOLX, CHLST, CHOLV, HDL, HDLP, LDL, LDLC, DLDLP, TGLX, TRIGL, TRIGP, CHHD, CHHDX  No results found for: [facility]  Lab Results   Component Value Date/Time    Color YELLOW/STRAW 05/11/2020 05:28 PM    Appearance CLEAR 05/11/2020 05:28 PM    Specific gravity 1.014 05/11/2020 05:28 PM    pH (UA) 5.0 05/11/2020 05:28 PM    Protein Negative 05/11/2020 05:28 PM    Glucose Negative 05/11/2020 05:28 PM    Ketone Negative 05/11/2020 05:28 PM    Bilirubin Negative 05/11/2020 05:28 PM    Urobilinogen 0.2 05/11/2020 05:28 PM    Nitrites Positive (A) 05/11/2020 05:28 PM    Leukocyte Esterase Negative 05/11/2020 05:28 PM    Epithelial cells FEW 05/11/2020 05:28 PM    Bacteria 4+ (A) 05/11/2020 05:28 PM    WBC 0-4 05/11/2020 05:28 PM    RBC  05/11/2020 05:28 PM         Medications Reviewed:     Current Facility-Administered Medications   Medication Dose Route Frequency    cefTRIAXone (ROCEPHIN) 1 g in lidocaine (PF) (XYLOCAINE) 10 mg/mL (1 %) IM injection  1 g IntraMUSCular DAILY    ALPRAZolam Jalen Ket) tablet 0.125 mg  0.125 mg Oral QID PRN    metoprolol tartrate (LOPRESSOR) tablet 25 mg  25 mg Oral BID    aspirin chewable tablet 81 mg  81 mg Oral DAILY    ezetimibe (ZETIA) tablet 10 mg  10 mg Oral DAILY    atorvastatin (LIPITOR) tablet 80 mg  80 mg Oral DAILY    acetaminophen (TYLENOL) tablet 650 mg  650 mg Oral Q6H PRN    Or    acetaminophen (TYLENOL) suppository 650 mg  650 mg Rectal Q6H PRN    sodium chloride (NS) flush 5-40 mL  5-40 mL IntraVENous Q8H    sodium chloride (NS) flush 5-40 mL  5-40 mL IntraVENous PRN     ______________________________________________________________________  EXPECTED LENGTH OF STAY: 3d 7h  ACTUAL LENGTH OF STAY:          1                 Jim Severance, MD

## 2020-05-14 NOTE — PROGRESS NOTES
5/14/2020 -   EDIN:  - RUR: 12%  - Current disposition is for discharge to [facility] with transport via family but may change pending therapy recommendations    - PT and OT evals pending 5/14  - Patient is more oriented today, 5/14  - Cultures pending  - Patient still has a sitter  CRM: Guru Whitaker, MPH, [address] [place] [address]; Z: [phone]

## 2020-05-15 LAB
BACTERIA SPEC CULT: ABNORMAL
CC UR VC: ABNORMAL
SERVICE CMNT-IMP: ABNORMAL

## 2020-05-15 PROCEDURE — 94760 N-INVAS EAR/PLS OXIMETRY 1: CPT

## 2020-05-15 PROCEDURE — 74011250637 HC RX REV CODE- 250/637: Performed by: HOSPITALIST

## 2020-05-15 PROCEDURE — 74011000250 HC RX REV CODE- 250: Performed by: INTERNAL MEDICINE

## 2020-05-15 PROCEDURE — 65660000000 HC RM CCU STEPDOWN

## 2020-05-15 PROCEDURE — 74011250636 HC RX REV CODE- 250/636: Performed by: INTERNAL MEDICINE

## 2020-05-15 PROCEDURE — 74011250637 HC RX REV CODE- 250/637: Performed by: INTERNAL MEDICINE

## 2020-05-15 PROCEDURE — 74011250637 HC RX REV CODE- 250/637: Performed by: FAMILY MEDICINE

## 2020-05-15 PROCEDURE — 74011250637 HC RX REV CODE- 250/637: Performed by: SPECIALIST

## 2020-05-15 RX ORDER — HALOPERIDOL 5 MG/ML
2 INJECTION INTRAMUSCULAR
Status: DISCONTINUED | OUTPATIENT
Start: 2020-05-15 | End: 2020-05-18 | Stop reason: HOSPADM

## 2020-05-15 RX ORDER — QUETIAPINE FUMARATE 25 MG/1
25 TABLET, FILM COATED ORAL
Status: DISCONTINUED | OUTPATIENT
Start: 2020-05-15 | End: 2020-05-18 | Stop reason: HOSPADM

## 2020-05-15 RX ORDER — ALPRAZOLAM 0.25 MG/1
0.25 TABLET ORAL
Status: DISCONTINUED | OUTPATIENT
Start: 2020-05-15 | End: 2020-05-15

## 2020-05-15 RX ORDER — QUETIAPINE FUMARATE 25 MG/1
25 TABLET, FILM COATED ORAL 2 TIMES DAILY
Status: DISCONTINUED | OUTPATIENT
Start: 2020-05-15 | End: 2020-05-18 | Stop reason: HOSPADM

## 2020-05-15 RX ADMIN — METOPROLOL TARTRATE 25 MG: 25 TABLET, FILM COATED ORAL at 18:57

## 2020-05-15 RX ADMIN — LIDOCAINE HYDROCHLORIDE 1 G: 10 INJECTION, SOLUTION EPIDURAL; INFILTRATION; INTRACAUDAL; PERINEURAL at 09:31

## 2020-05-15 RX ADMIN — HALOPERIDOL LACTATE 2 MG: 5 INJECTION INTRAMUSCULAR at 15:41

## 2020-05-15 RX ADMIN — QUETIAPINE FUMARATE 25 MG: 25 TABLET ORAL at 19:13

## 2020-05-15 RX ADMIN — ALPRAZOLAM 0.12 MG: 0.25 TABLET ORAL at 02:56

## 2020-05-15 NOTE — PROGRESS NOTES
Patient spits out the pills. Nursing will try to convince her again later. 1206H  Patient was encouraged to take her medicines, this time ground and mixed in a \"vanilla shake\", the patient took a spoonful, kept in her mouth and spit it out after. Patient would at times say that she would like to take the pills, but refuses after.

## 2020-05-15 NOTE — PROGRESS NOTES
Problem: Falls - Risk of  Goal: *Absence of Falls  Description: Document Collin Henning Fall Risk and appropriate interventions in the flowsheet.   Outcome: Progressing Towards Goal  Note: Fall Risk Interventions:  Mobility Interventions: Bed/chair exit alarm, Communicate number of staff needed for ambulation/transfer, Patient to call before getting OOB, Utilize walker, cane, or other assistive device    Mentation Interventions: Adequate sleep, hydration, pain control, Bed/chair exit alarm, Door open when patient unattended, Familiar objects from home, Increase mobility, More frequent rounding, Reorient patient, Room close to nurse's station, Toileting rounds, Self-releasing belt, Update white board    Medication Interventions: Bed/chair exit alarm, Evaluate medications/consider consulting pharmacy, Patient to call before getting OOB, Teach patient to arise slowly    Elimination Interventions: Bed/chair exit alarm, Call light in reach, Patient to call for help with toileting needs, Toilet paper/wipes in reach, Toileting schedule/hourly rounds              Problem: Patient Education: Go to Patient Education Activity  Goal: Patient/Family Education  Outcome: Progressing Towards Goal     Problem: General Medical Care Plan  Goal: *Skin integrity maintained  Outcome: Progressing Towards Goal

## 2020-05-15 NOTE — PROGRESS NOTES
EDIN:    1200 - CM call to [facility]. They are an independent living facility. There is no \"medical\" unit per say. CM spoke with patients daughter - Ami Form [phone] and she stated if the patient is still acting erratically, restless and impulsive, she and her  are not equipped to take take care of her mother. Per daughter, patients change in mentality started a few weeks ago and if that has not changed, they will need to look into placement for her until she is able to care for herself in independent living. They live a mile away form patients home. -- Patient pulls out IVs so meds have to be given subq. -- Patient will NOT eat or drink anything. Will not take pills or tries to hide them in her mouth when she pretends to take them. -- Patient is a 1-1 because she is constantly trying to get out of bed and run. She will not follow commands. -- Patient will need NOT to be a 1-1 for 48 hours prior to SNF placement. -- CM emailed patients daughter with lists of rehabs/skilled nursing facilities with CMS star ratings. Her daughter and son will go over list and provide us with their choices in the event their mother will need placement. Update to 3N Nurse - Rachell Cruz RN. CM will continue to follow and assist with EDIN needs as they arise. Available on [employer]. [employer]  MSN, [facility], RN, [address] - [phone].

## 2020-05-15 NOTE — PROGRESS NOTES
1523H   Patient is restless and impulsive. No command following. Resists staff when assisted to commode and wants to walk outside. MD called for PRN meds. Haldol PRN ordered. Pls see MAR.     1630H   Patient seems to be calm and sleeping in bed after Haldol administration but per sitter, she is awake, and will try to get out of the bed from time to time and then decide not to.     1700H   CM requested to verify meds from pharmacist to pharmacist. Nursing called the Pharma, and stated that medicines had been verified by a nurse to the pharmacist on 5/12. Pharmacist also mentioned that there would be no difference if they called the patient's Pharmacy as it would be the same as what is written on the medicine bottles. Per CM, the patient lives alone. The patient's daughter is concerned that patient may not be taking her prescribed meds. Patient lives at [place] [id number]. 1715H  Patient's medicines placed in Right drawer with the rings. Patient remained confused, states that she Lives in [place], and does not know her name. Incontinent of urine. Has the urge to urinate and would get out of the bed. Patient has tendency to pull staff when assisted. Poor oral intake, refuses oral meds. MD aware. 1845H   Checked on patient, sitter reported she ate her dessert and had some fluid. Nursing attempted to give her scheduled  Metoprolol, patient asked Belinda Soto is that? \" when told it was Metoprolol, she said, \"That's my favorite medicine. \" Patient took the pill herself. Then patient bargained: \"Now that I took the medicine, can I go to the bathroom? \" Nursing assisted her to the bathroom using walker, there, she just passed gas and urinated. Patient assisted back to the bed. Nursing tested patient again if she will take the Seroquel and she did. 1915H    Patient can now tell her name, knows she is in the hospital, came here to the hospital because she was dizzy, and the year is 2020. Incoming RN updated. Bedside and Verbal shift change report given to RAE Yeboah (oncoming nurse) by Ruthie Vinson RN (offgoing nurse).  Report included the following information SBAR, Kardex, Intake/Output, MAR and Recent Results.

## 2020-05-15 NOTE — PROGRESS NOTES
Bedside and Verbal shift change report given to [address] [place] [address] (oncoming nurse) by Ravi Beavers, RAE (offgoing nurse). Report included the following information SBAR, Kardex, Intake/Output, Recent Results and Cardiac Rhythm NSR.

## 2020-05-15 NOTE — PROGRESS NOTES
[facility] [facility] Adult  Hospitalist Group                                                                                          Hospitalist Progress Note  Jim Severance, MD  Answering service: [phone] OR [phone] from in house phone        Date of Service:  5/15/2020  NAME:  Renell Schwab  :  1935  MRN:  [id number]    Admission Summary:   Renell Schwab is a 82 y.o. female medical history of CAD and hypertension who apparently has had mentation issues over the past few weeks that have waxed and waned. She apparently has gotten worse over the last 24 hours. She is been acting \"bizarre\" as reported by 1 of her neighbors. She lives at [facility] which is apparently some sort of nursing home. She was found to have elevated troponin of 0.11 in the ED. EMS reports that she was ANO x4. She was agitated and trying to leave in the ED and so she was given Geodon. O2 sats 95% on room air. She is afebrile. CBC normal limits. Chemistry shows elevated creatinine 1.08, slightly elevated AST 50. No previous records to compare. Interval history / Subjective:   Patient feels ok, pretty confused this morning again, was challenge to take care of last night with the nurses. final Urine cultures results still pending, PT/OT eval pending. I called Prisca jas 'pt's daughter' and updated on current situation.      Assessment & Plan:     Acute infectious encephalopathy POA- due to UTI- Urine cultures GNR  -given Geodon in ER due to agitation-Head CT negative for acute processes  PRN xanax ordered- Negative COVID testing  Started Abx for UTI but had to give them IM due to loss of IV and refusal to take po meds  - PT/OT eval-   - start serequel, sitter     Elevated troponin with hx CAD  - troponin minimally elevated, no chest pain, stable BP and HR  - continue home meds aspirin, metoprolol, lipitor   - cardio following-Echo: preserved ef, no RWMA, EKG reviewed and unremarkable     Elevated AST -Suspect due to dehydration -resolved  Essential HTN -BP elevated this am, continue metoprolol and monitor BP  HLD- resumed statin med on admission    Code status: Full Code  DVT prophylaxis: Lovenox  Care Plan discussed with: Patient/Family and Nurse  Anticipated Disposition: back to [address] [place] [address] living- or alternative/ memory unit  Anticipated Discharge: 24 hours to 48 hours     Hospital Problems  Date Reviewed: 5/12/2020          Codes Class Noted POA    UTI (urinary tract infection) ICD-10-CM: N39.0  ICD-9-CM: 599.0  5/13/2020 Unknown        Elevated troponin ICD-10-CM: R79.89  ICD-9-CM: 790.6  5/11/2020 Yes        Hypertension ICD-10-CM: F94  ICD-9-CM: 401.9  Unknown Yes        CAD (coronary artery disease) ICD-10-CM: I25.10  ICD-9-CM: 414.00  Unknown Yes        Elevated serum creatinine ICD-10-CM: R79.89  ICD-9-CM: 790.99  Unknown Yes        Elevated AST (SGOT) ICD-10-CM: R74.0  ICD-9-CM: 790.4  Unknown Yes                Vital Signs:    Last 24hrs VS reviewed since prior progress note.  Most recent are:  Visit Vitals  BP (!) 174/91 (BP 1 Location: Right arm, BP Patient Position: At rest)   Pulse 72   Temp 97.7 °F (36.5 °C)   Resp 17   Ht 5' 4\" (1.626 m)   Wt 88.9 kg (195 lb 15.8 oz)   SpO2 95%   BMI 33.64 kg/m²     Patient Vitals for the past 24 hrs:   Temp Pulse Resp BP SpO2   05/15/20 0330 97.7 °F (36.5 °C) 72 17 (!) 174/91 95 %   05/15/20 0002 98.5 °F (36.9 °C) 67 16 159/75 95 %   05/14/20 2012     95 %   05/14/20 1915 98.9 °F (37.2 °C) 73 16 157/69 95 %   05/14/20 1519 99.3 °F (37.4 °C) 70 18 163/89 94 %   05/14/20 1456    117/69    05/14/20 1148 98.4 °F (36.9 °C) 64 16 117/69 96 %   05/14/20 0810 98.2 °F (36.8 °C) 63 16 132/76 94 %       Intake/Output Summary (Last 24 hours) at 5/15/2020 0733  Last data filed at 5/15/2020 0002  Gross per 24 hour   Intake 1020 ml   Output    Net 1020 ml        Physical Examination:             Constitutional:  confused, requiring frequent redirection, constantly climbing out of bed        Resp:  Decreased bronchial breath sound bilaterally but clear. No wheezing/   CV:  Regular rhythm, normal rate, no murmurs,     GI:  Soft, non distended, non tender. Musculoskeletal:  No edema     Neurologic:  Moves all extremities. Follows commands, confused. Skin:  Good turgor, no rashes or ulcers       Data Review:    Review and/or order of clinical lab test  Review and/or order of tests in the radiology section of CPT  Review and/or order of tests in the medicine section of CPT      Labs:     No results for input(s): WBC, HGB, HCT, PLT, HGBEXT, HCTEXT, PLTEXT, HGBEXT, HCTEXT, PLTEXT in the last 72 hours. No results for input(s): NA, K, CL, CO2, BUN, CREA, GLU, CA, MG, PHOS, URICA in the last 72 hours. No results for input(s): SGOT, GPT, ALT, AP, TBIL, TBILI, TP, ALB, GLOB, GGT, AML, LPSE in the last 72 hours. No lab exists for component: AMYP, HLPSE  No results for input(s): INR, PTP, APTT, INREXT, INREXT in the last 72 hours. No results for input(s): FE, TIBC, PSAT, FERR in the last 72 hours. No results found for: FOL, RBCF   No results for input(s): PH, PCO2, PO2 in the last 72 hours.   Recent Labs     05/12/20  0806   TROIQ 0.14*     No results found for: CHOL, CHOLX, CHLST, CHOLV, HDL, HDLP, LDL, LDLC, DLDLP, TGLX, TRIGL, TRIGP, CHHD, CHHDX  No results found for: [facility]  Lab Results   Component Value Date/Time    Color YELLOW/STRAW 05/11/2020 05:28 PM    Appearance CLEAR 05/11/2020 05:28 PM    Specific gravity 1.014 05/11/2020 05:28 PM    pH (UA) 5.0 05/11/2020 05:28 PM    Protein Negative 05/11/2020 05:28 PM    Glucose Negative 05/11/2020 05:28 PM    Ketone Negative 05/11/2020 05:28 PM    Bilirubin Negative 05/11/2020 05:28 PM    Urobilinogen 0.2 05/11/2020 05:28 PM    Nitrites Positive (A) 05/11/2020 05:28 PM    Leukocyte Esterase Negative 05/11/2020 05:28 PM    Epithelial cells FEW 05/11/2020 05:28 PM    Bacteria 4+ (A) 05/11/2020 05:28 PM    WBC 0-4 05/11/2020 05:28 PM    RBC  05/11/2020 05:28 PM         Medications Reviewed:     Current Facility-Administered Medications   Medication Dose Route Frequency    cefTRIAXone (ROCEPHIN) 1 g in 0.9% sodium chloride (MBP/ADV) 50 mL  1 g IntraVENous Q24H    ALPRAZolam (XANAX) tablet 0.125 mg  0.125 mg Oral QID PRN    metoprolol tartrate (LOPRESSOR) tablet 25 mg  25 mg Oral BID    aspirin chewable tablet 81 mg  81 mg Oral DAILY    ezetimibe (ZETIA) tablet 10 mg  10 mg Oral DAILY    atorvastatin (LIPITOR) tablet 80 mg  80 mg Oral DAILY    acetaminophen (TYLENOL) tablet 650 mg  650 mg Oral Q6H PRN    Or    acetaminophen (TYLENOL) suppository 650 mg  650 mg Rectal Q6H PRN    sodium chloride (NS) flush 5-40 mL  5-40 mL IntraVENous Q8H    sodium chloride (NS) flush 5-40 mL  5-40 mL IntraVENous PRN     ______________________________________________________________________  EXPECTED LENGTH OF STAY: 3d 7h  ACTUAL LENGTH OF STAY:          2                 Kennedy Juárez MD

## 2020-05-15 NOTE — PROGRESS NOTES
Assumed care, patient is confused, getting out of bed. MD in aware. Patient kept on removing rings (3), (staff secured in plastic bag). Music therapy provided.

## 2020-05-16 PROCEDURE — 65660000000 HC RM CCU STEPDOWN

## 2020-05-16 PROCEDURE — 74011250637 HC RX REV CODE- 250/637: Performed by: INTERNAL MEDICINE

## 2020-05-16 PROCEDURE — 74011250637 HC RX REV CODE- 250/637: Performed by: HOSPITALIST

## 2020-05-16 PROCEDURE — 74011000250 HC RX REV CODE- 250: Performed by: INTERNAL MEDICINE

## 2020-05-16 PROCEDURE — 74011250637 HC RX REV CODE- 250/637: Performed by: SPECIALIST

## 2020-05-16 PROCEDURE — 74011250636 HC RX REV CODE- 250/636: Performed by: INTERNAL MEDICINE

## 2020-05-16 RX ADMIN — ATORVASTATIN CALCIUM 80 MG: 40 TABLET, FILM COATED ORAL at 08:43

## 2020-05-16 RX ADMIN — Medication 10 ML: at 21:57

## 2020-05-16 RX ADMIN — METOPROLOL TARTRATE 25 MG: 25 TABLET, FILM COATED ORAL at 18:06

## 2020-05-16 RX ADMIN — METOPROLOL TARTRATE 25 MG: 25 TABLET, FILM COATED ORAL at 08:43

## 2020-05-16 RX ADMIN — QUETIAPINE FUMARATE 25 MG: 25 TABLET ORAL at 18:06

## 2020-05-16 RX ADMIN — QUETIAPINE FUMARATE 25 MG: 25 TABLET ORAL at 08:43

## 2020-05-16 RX ADMIN — Medication 10 ML: at 12:56

## 2020-05-16 RX ADMIN — LIDOCAINE HYDROCHLORIDE 1 G: 10 INJECTION, SOLUTION EPIDURAL; INFILTRATION; INTRACAUDAL; PERINEURAL at 08:45

## 2020-05-16 RX ADMIN — ASPIRIN 81 MG 81 MG: 81 TABLET ORAL at 08:43

## 2020-05-16 RX ADMIN — EZETIMIBE 10 MG: 10 TABLET ORAL at 08:43

## 2020-05-16 NOTE — PROGRESS NOTES
[facility] Adult  Hospitalist Group                                                                                          Hospitalist Progress Note  Audie Sommer MD  Answering service: [phone] OR [phone] from in house phone        Date of Service:  2020  NAME:  Osmani Molina  :  1935  MRN:  [id number]    Admission Summary:   Osmani Molina is a 82 y.o. female medical history of CAD and hypertension who apparently has had mentation issues over the past few weeks that have waxed and waned. She apparently has gotten worse over the last 24 hours. She is been acting \"bizarre\" as reported by 1 of her neighbors. She lives at [facility] which is apparently some sort of nursing home. She was found to have elevated troponin of 0.11 in the ED. EMS reports that she was ANO x4. She was agitated and trying to leave in the ED and so she was given Geodon. O2 sats 95% on room air. She is afebrile. CBC normal limits. Chemistry shows elevated creatinine 1.08, slightly elevated AST 50. No previous records to compare. Interval history / Subjective:   Patient feels well, pretty calm this morning, just woke up, no acute events overnight, CM in contact with family yesterday re- placement.      Assessment & Plan:     Acute infectious encephalopathy POA- due to UTI- Urine cultures E.coli  -given Geodon in ER due to agitation-Head CT negative for acute processes  PRN xanax ordered- Negative COVID testing  - Abx for UTI but had to give them IM due to loss of IV and refusal to take po meds  - PT/OT eval- - start serequel- refused pills, try Haldol PRN IM inj, sitter     Elevated troponin with hx CAD  - troponin minimally elevated, no chest pain, stable BP and HR  - continue home meds aspirin, metoprolol, lipitor   - cardio following-Echo: preserved ef, no RWMA, EKG reviewed and unremarkable     Elevated AST -Suspect due to dehydration -resolved  Essential HTN -BP elevated this am, continue metoprolol and monitor BP  HLD- resumed statin med on admission    Code status: Full Code  DVT prophylaxis: Lovenox  Care Plan discussed with: Patient/Family and Nurse  Anticipated Disposition: back to [address] [place] [address] living- or alternative/ memory unit  Anticipated Discharge: 24 hours to 48 hours     Hospital Problems  Date Reviewed: 5/12/2020          Codes Class Noted POA    UTI (urinary tract infection) ICD-10-CM: N39.0  ICD-9-CM: 599.0  5/13/2020 Unknown        Elevated troponin ICD-10-CM: R79.89  ICD-9-CM: 790.6  5/11/2020 Yes        Hypertension ICD-10-CM: N18  ICD-9-CM: 401.9  Unknown Yes        CAD (coronary artery disease) ICD-10-CM: I25.10  ICD-9-CM: 414.00  Unknown Yes        Elevated serum creatinine ICD-10-CM: R79.89  ICD-9-CM: 790.99  Unknown Yes        Elevated AST (SGOT) ICD-10-CM: R74.0  ICD-9-CM: 790.4  Unknown Yes                Vital Signs:    Last 24hrs VS reviewed since prior progress note. Most recent are:  Visit Vitals  /64 (BP 1 Location: Right arm, BP Patient Position: At rest)   Pulse 72   Temp 98.2 °F (36.8 °C)   Resp 16   Ht 5' 4\" (1.626 m)   Wt 90.9 kg (200 lb 6.4 oz)   SpO2 92%   BMI 34.40 kg/m²     Patient Vitals for the past 24 hrs:   Temp Pulse Resp BP SpO2   05/16/20 0348 98.2 °F (36.8 °C) 72 16 153/64 92 %   05/15/20 2336 98.1 °F (36.7 °C) 73 16 145/61 94 %   05/1935 98.4 °F (36.9 °C) 68 18 167/77 96 %   05/15/20 1922     96 %   05/15/20 1554 98.8 °F (37.1 °C) 76 18 183/87 100 %   05/15/20 1200 98 °F (36.7 °C) 74 18 168/83 96 %   05/15/20 0900 98.3 °F (36.8 °C) 92 18 194/84 99 %       Intake/Output Summary (Last 24 hours) at 5/16/2020 0718  Last data filed at 5/15/2020 1800  Gross per 24 hour   Intake 120 ml   Output 2 ml   Net 118 ml        Physical Examination:             Constitutional:  most of the time confused, requiring frequent redirection, constantly climbing out of bed        Resp:  Decreased bronchial breath sound bilaterally but clear.  No wheezing/        GI:  Soft, non distended, non tender. Musculoskeletal:  No edema     Neurologic:  Moves all extremities. Follows commands, confused. Skin:  Good turgor, no rashes or ulcers       Data Review:    Review and/or order of clinical lab test  Review and/or order of tests in the radiology section of CPT  Review and/or order of tests in the medicine section of CPT      Labs:     No results for input(s): WBC, HGB, HCT, PLT, HGBEXT, HCTEXT, PLTEXT, HGBEXT, HCTEXT, PLTEXT in the last 72 hours. No results for input(s): NA, K, CL, CO2, BUN, CREA, GLU, CA, MG, PHOS, URICA in the last 72 hours. No results for input(s): SGOT, GPT, ALT, AP, TBIL, TBILI, TP, ALB, GLOB, GGT, AML, LPSE in the last 72 hours. No lab exists for component: AMYP, HLPSE  No results for input(s): INR, PTP, APTT, INREXT, INREXT in the last 72 hours. No results for input(s): FE, TIBC, PSAT, FERR in the last 72 hours. No results found for: FOL, RBCF   No results for input(s): PH, PCO2, PO2 in the last 72 hours. No results for input(s): CPK, CKNDX, TROIQ in the last 72 hours.     No lab exists for component: CPKMB  No results found for: CHOL, CHOLX, CHLST, CHOLV, HDL, HDLP, LDL, LDLC, DLDLP, TGLX, TRIGL, TRIGP, CHHD, CHHDX  No results found for: [facility]  Lab Results   Component Value Date/Time    Color YELLOW/STRAW 05/11/2020 05:28 PM    Appearance CLEAR 05/11/2020 05:28 PM    Specific gravity 1.014 05/11/2020 05:28 PM    pH (UA) 5.0 05/11/2020 05:28 PM    Protein Negative 05/11/2020 05:28 PM    Glucose Negative 05/11/2020 05:28 PM    Ketone Negative 05/11/2020 05:28 PM    Bilirubin Negative 05/11/2020 05:28 PM    Urobilinogen 0.2 05/11/2020 05:28 PM    Nitrites Positive (A) 05/11/2020 05:28 PM    Leukocyte Esterase Negative 05/11/2020 05:28 PM    Epithelial cells FEW 05/11/2020 05:28 PM    Bacteria 4+ (A) 05/11/2020 05:28 PM    WBC 0-4 05/11/2020 05:28 PM    RBC  05/11/2020 05:28 PM         Medications Reviewed: Current Facility-Administered Medications   Medication Dose Route Frequency    QUEtiapine (SEROquel) tablet 25 mg  25 mg Oral BID    QUEtiapine (SEROquel) tablet 25 mg  25 mg Oral Q8H PRN    cefTRIAXone (ROCEPHIN) 1 g in lidocaine (PF) (XYLOCAINE) 10 mg/mL (1 %) IM injection  1 g IntraMUSCular Q24H    haloperidol lactate (HALDOL) injection 2 mg  2 mg IntraMUSCular Q8H PRN    metoprolol tartrate (LOPRESSOR) tablet 25 mg  25 mg Oral BID    aspirin chewable tablet 81 mg  81 mg Oral DAILY    ezetimibe (ZETIA) tablet 10 mg  10 mg Oral DAILY    atorvastatin (LIPITOR) tablet 80 mg  80 mg Oral DAILY    acetaminophen (TYLENOL) tablet 650 mg  650 mg Oral Q6H PRN    Or    acetaminophen (TYLENOL) suppository 650 mg  650 mg Rectal Q6H PRN    sodium chloride (NS) flush 5-40 mL  5-40 mL IntraVENous Q8H    sodium chloride (NS) flush 5-40 mL  5-40 mL IntraVENous PRN     ______________________________________________________________________  EXPECTED LENGTH OF STAY: 3d 21h  ACTUAL LENGTH OF STAY:          3                 Dano Marks MD

## 2020-05-16 NOTE — PROGRESS NOTES
1615: Pt. Has been pleasant, A&O x4, and cooperative today. Pt allowed me to place a peripheral IV. Pt. Has eaten all meals independently and had multiple phone calls. Pt. Has bed alarm in place and room door open near the nurses station. Sitter has been removed for a trial run of pt. Behavior. 1715: Pt. Ate 100% of her dinner independently. Pt. As been speaking on he phone. No complaints of pain. VSS.    1805: Evening medications administered. Pt. Is calm and alert. Sitter will not be needed for evening shift. Per notes, pt must be sitter-free for 48 hours prior to discharge. Bedside and Verbal shift change report given to [address] Etienne,[address] (oncoming nurse) by Rosi Solorio RN (offgoing nurse). Report included the following information SBAR, Kardex, ED Summary, Intake/Output, MAR, Recent Results and Cardiac Rhythm NSR.

## 2020-05-16 NOTE — PROGRESS NOTES
Problem: Falls - Risk of  Goal: *Absence of Falls  Description: Document Ailyn Ayala Fall Risk and appropriate interventions in the flowsheet. Outcome: Progressing Towards Goal  Note: Fall Risk Interventions:  Mobility Interventions: Bed/chair exit alarm    Mentation Interventions: Bed/chair exit alarm, Family/sitter at bedside    Medication Interventions: Bed/chair exit alarm    Elimination Interventions: Bed/chair exit alarm         Bedside and Verbal shift change report given to Gamal (oncoming nurse) by Jose Luis Melissa (offgoing nurse).  Report included the following information SBAR, Kardex, Procedure Summary, Intake/Output, MAR, Accordion, Recent Results, Med Rec Status and Cardiac Rhythm NSR.

## 2020-05-17 PROCEDURE — 65660000000 HC RM CCU STEPDOWN

## 2020-05-17 PROCEDURE — 74011250637 HC RX REV CODE- 250/637: Performed by: SPECIALIST

## 2020-05-17 PROCEDURE — 74011250637 HC RX REV CODE- 250/637: Performed by: INTERNAL MEDICINE

## 2020-05-17 PROCEDURE — 74011250637 HC RX REV CODE- 250/637: Performed by: HOSPITALIST

## 2020-05-17 RX ADMIN — QUETIAPINE FUMARATE 25 MG: 25 TABLET ORAL at 09:07

## 2020-05-17 RX ADMIN — ASPIRIN 81 MG 81 MG: 81 TABLET ORAL at 09:07

## 2020-05-17 RX ADMIN — METOPROLOL TARTRATE 25 MG: 25 TABLET, FILM COATED ORAL at 18:11

## 2020-05-17 RX ADMIN — METOPROLOL TARTRATE 25 MG: 25 TABLET, FILM COATED ORAL at 09:07

## 2020-05-17 RX ADMIN — Medication 10 ML: at 05:43

## 2020-05-17 RX ADMIN — EZETIMIBE 10 MG: 10 TABLET ORAL at 09:07

## 2020-05-17 RX ADMIN — ATORVASTATIN CALCIUM 80 MG: 40 TABLET, FILM COATED ORAL at 09:07

## 2020-05-17 RX ADMIN — QUETIAPINE FUMARATE 25 MG: 25 TABLET ORAL at 18:11

## 2020-05-17 NOTE — PROGRESS NOTES
Bedside and Verbal shift change report given to RAE Yeung (oncoming nurse) by Eduardo Sahni RN (offgoing nurse). Report included the following information SBAR, Kardex, Intake/Output, MAR and Cardiac Rhythm NSR.

## 2020-05-17 NOTE — PROGRESS NOTES
[facility]  Hospitalist Group                                                                                          Hospitalist Progress Note  Marlyn Rodriges MD  Answering service: [phone] OR [phone] from in house phone        Date of Service:  2020  NAME:  Dimitrios Welsh  :  1935  MRN:  [id number]    Admission Summary:   Dimitrios Welsh is a 82 y.o. female medical history of CAD and hypertension who apparently has had mentation issues over the past few weeks that have waxed and waned. She apparently has gotten worse over the last 24 hours. She is been acting \"bizarre\" as reported by 1 of her neighbors. She lives at [facility] which is apparently some sort of nursing home. She was found to have elevated troponin of 0.11 in the ED. EMS reports that she was ANO x4. She was agitated and trying to leave in the ED and so she was given Geodon. O2 sats 95% on room air. She is afebrile. CBC normal limits. Chemistry shows elevated creatinine 1.08, slightly elevated AST 50. No previous records to compare. Interval history / Subjective:   Patient feels well, no acute events overnight, less confused compared to prior nights. Yesterday wasn't much confused, allowed RNs to place IV.   I spoke to daughter, caregiver companies can send cargivers to independent living in [facility] will arrange for this ?tomorrow     Assessment & Plan:     UTI: - Treated- Urine cultures E.coli  Acute infectious encephalopathy POA- due to UTI- improving  -given Geodon in ER due to agitation-Head CT negative for acute processes  PRN xanax ordered- Negative COVID testing  - Abx for UTI but had to give them IM due to loss of IV and refusal to take po meds  - PT/OT eval- - start serequel- refused pills, try Haldol PRN IM inj, sitter     Elevated troponin with hx CAD  - troponin minimally elevated, no chest pain, stable BP and HR  - continue home meds aspirin, metoprolol, lipitor   - cardio following-Echo: preserved ef, no RWMA, EKG reviewed and unremarkable     Elevated AST -Suspect due to dehydration -resolved  Essential HTN -BP elevated this am, continue metoprolol and monitor BP  HLD- resumed statin med on admission    Code status: Full Code  DVT prophylaxis: Lovenox  Care Plan discussed with: Patient/Family and Nurse  Anticipated Disposition: back to [address] [place] [address] living- or alternative/ memory unit  Anticipated Discharge: 24 hours to 48 hours     Hospital Problems  Date Reviewed: 5/12/2020          Codes Class Noted POA    UTI (urinary tract infection) ICD-10-CM: N39.0  ICD-9-CM: 599.0  5/13/2020 Yes        Elevated troponin ICD-10-CM: R79.89  ICD-9-CM: 790.6  5/11/2020 Yes        Hypertension ICD-10-CM: D45  ICD-9-CM: 401.9  Unknown Yes        CAD (coronary artery disease) ICD-10-CM: I25.10  ICD-9-CM: 414.00  Unknown Yes        Elevated serum creatinine ICD-10-CM: R79.89  ICD-9-CM: 790.99  Unknown Yes        Elevated AST (SGOT) ICD-10-CM: R74.0  ICD-9-CM: 790.4  Unknown Yes                Vital Signs:    Last 24hrs VS reviewed since prior progress note.  Most recent are:  Visit Vitals  /75 (BP 1 Location: Left arm, BP Patient Position: At rest)   Pulse 73   Temp 98.2 °F (36.8 °C)   Resp 16   Ht 5' 4\" (1.626 m)   Wt 80.2 kg (176 lb 12.9 oz)   SpO2 93%   BMI 30.35 kg/m²     Patient Vitals for the past 24 hrs:   Temp Pulse Resp BP SpO2   05/17/20 0717 98.2 °F (36.8 °C) 73 16 143/75 93 %   05/17/20 0344 98.3 °F (36.8 °C) 68 16 123/70 91 %   05/17/20 0011 97.2 °F (36.2 °C) 67 18 120/67 92 %   05/16/20 1954 99.1 °F (37.3 °C) 71 16 118/62 94 %   05/16/20 1908 98 °F (36.7 °C) 79  102/65 93 %   05/16/20 1803  86  120/65    05/16/20 1530 98.6 °F (37 °C) 75 18 131/69 94 %   05/16/20 1215 97.5 °F (36.4 °C) 83 20 121/74 95 %   05/16/20 0947 98.6 °F (37 °C) 84 16 123/74 94 %   05/16/20 0802 98.1 °F (36.7 °C) 78 18 145/70 94 %     No intake or output data in the 24 hours ending 05/17/20 8721     Physical Examination:             Constitutional:  most of the time confused, requiring frequent redirection, constantly climbing out of bed        Resp:  Decreased bronchial breath sound bilaterally but clear. No wheezing/        GI:  Soft, non distended, non tender. Musculoskeletal:  No edema     Neurologic:  Moves all extremities. Follows commands, confused. Skin:  Good turgor, no rashes or ulcers       Data Review:    Review and/or order of clinical lab test  Review and/or order of tests in the radiology section of CPT  Review and/or order of tests in the medicine section of CPT      Labs:     No results for input(s): WBC, HGB, HCT, PLT, HGBEXT, HCTEXT, PLTEXT, HGBEXT, HCTEXT, PLTEXT in the last 72 hours. No results for input(s): NA, K, CL, CO2, BUN, CREA, GLU, CA, MG, PHOS, URICA in the last 72 hours. No results for input(s): SGOT, GPT, ALT, AP, TBIL, TBILI, TP, ALB, GLOB, GGT, AML, LPSE in the last 72 hours. No lab exists for component: AMYP, HLPSE  No results for input(s): INR, PTP, APTT, INREXT, INREXT in the last 72 hours. No results for input(s): FE, TIBC, PSAT, FERR in the last 72 hours. No results found for: FOL, RBCF   No results for input(s): PH, PCO2, PO2 in the last 72 hours. No results for input(s): CPK, CKNDX, TROIQ in the last 72 hours.     No lab exists for component: CPKMB  No results found for: CHOL, CHOLX, CHLST, CHOLV, HDL, HDLP, LDL, LDLC, DLDLP, TGLX, TRIGL, TRIGP, CHHD, CHHDX  No results found for: [facility]  Lab Results   Component Value Date/Time    Color YELLOW/STRAW 05/11/2020 05:28 PM    Appearance CLEAR 05/11/2020 05:28 PM    Specific gravity 1.014 05/11/2020 05:28 PM    pH (UA) 5.0 05/11/2020 05:28 PM    Protein Negative 05/11/2020 05:28 PM    Glucose Negative 05/11/2020 05:28 PM    Ketone Negative 05/11/2020 05:28 PM    Bilirubin Negative 05/11/2020 05:28 PM    Urobilinogen 0.2 05/11/2020 05:28 PM    Nitrites Positive (A) 05/11/2020 05:28 PM    Leukocyte Esterase Negative 05/11/2020 05:28 PM    Epithelial cells FEW 05/11/2020 05:28 PM    Bacteria 4+ (A) 05/11/2020 05:28 PM    WBC 0-4 05/11/2020 05:28 PM    RBC  05/11/2020 05:28 PM         Medications Reviewed:     Current Facility-Administered Medications   Medication Dose Route Frequency    QUEtiapine (SEROquel) tablet 25 mg  25 mg Oral BID    QUEtiapine (SEROquel) tablet 25 mg  25 mg Oral Q8H PRN    haloperidol lactate (HALDOL) injection 2 mg  2 mg IntraMUSCular Q8H PRN    metoprolol tartrate (LOPRESSOR) tablet 25 mg  25 mg Oral BID    aspirin chewable tablet 81 mg  81 mg Oral DAILY    ezetimibe (ZETIA) tablet 10 mg  10 mg Oral DAILY    atorvastatin (LIPITOR) tablet 80 mg  80 mg Oral DAILY    acetaminophen (TYLENOL) tablet 650 mg  650 mg Oral Q6H PRN    Or    acetaminophen (TYLENOL) suppository 650 mg  650 mg Rectal Q6H PRN    sodium chloride (NS) flush 5-40 mL  5-40 mL IntraVENous Q8H    sodium chloride (NS) flush 5-40 mL  5-40 mL IntraVENous PRN     ______________________________________________________________________  EXPECTED LENGTH OF STAY: 3d 21h  ACTUAL LENGTH OF STAY:          4                 Aneesh Bell MD

## 2020-05-17 NOTE — PROGRESS NOTES
Problem: Falls - Risk of  Goal: *Absence of Falls  Description: Document Wisdom Reason Fall Risk and appropriate interventions in the flowsheet.   Outcome: Progressing Towards Goal  Note: Fall Risk Interventions:  Mobility Interventions: Bed/chair exit alarm    Mentation Interventions: Bed/chair exit alarm    Medication Interventions: Evaluate medications/consider consulting pharmacy    Elimination Interventions: Patient to call for help with toileting needs, Bed/chair exit alarm              Problem: General Medical Care Plan  Goal: *Vital signs within specified parameters  Outcome: Progressing Towards Goal  Goal: *Labs within defined limits  Outcome: Progressing Towards Goal  Goal: *Absence of infection signs and symptoms  Outcome: Progressing Towards Goal  Goal: *Optimal pain control at patient's stated goal  Outcome: Progressing Towards Goal  Goal: *Skin integrity maintained  Outcome: Progressing Towards Goal  Goal: *Fluid volume balance  Outcome: Progressing Towards Goal     Problem: Hypertension  Goal: *Blood pressure within specified parameters  Outcome: Progressing Towards Goal  Goal: *Fluid volume balance  Outcome: Progressing Towards Goal

## 2020-05-18 VITALS
WEIGHT: 190.48 LBS | HEART RATE: 64 BPM | OXYGEN SATURATION: 96 % | TEMPERATURE: 98.2 F | RESPIRATION RATE: 15 BRPM | HEIGHT: 64 IN | BODY MASS INDEX: 32.52 KG/M2 | SYSTOLIC BLOOD PRESSURE: 121 MMHG | DIASTOLIC BLOOD PRESSURE: 68 MMHG

## 2020-05-18 LAB
ANION GAP SERPL CALC-SCNC: 7 MMOL/L (ref 5–15)
BUN SERPL-MCNC: 16 MG/DL (ref 6–20)
BUN/CREAT SERPL: 18 (ref 12–20)
CALCIUM SERPL-MCNC: 8.4 MG/DL (ref 8.5–10.1)
CHLORIDE SERPL-SCNC: 107 MMOL/L (ref 97–108)
CO2 SERPL-SCNC: 21 MMOL/L (ref 21–32)
CREAT SERPL-MCNC: 0.89 MG/DL (ref 0.55–1.02)
GLUCOSE SERPL-MCNC: 89 MG/DL (ref 65–100)
POTASSIUM SERPL-SCNC: 4.8 MMOL/L (ref 3.5–5.1)
SODIUM SERPL-SCNC: 135 MMOL/L (ref 136–145)

## 2020-05-18 PROCEDURE — 74011250637 HC RX REV CODE- 250/637: Performed by: SPECIALIST

## 2020-05-18 PROCEDURE — 80048 BASIC METABOLIC PNL TOTAL CA: CPT

## 2020-05-18 PROCEDURE — 74011250637 HC RX REV CODE- 250/637: Performed by: HOSPITALIST

## 2020-05-18 PROCEDURE — 74011250637 HC RX REV CODE- 250/637: Performed by: INTERNAL MEDICINE

## 2020-05-18 PROCEDURE — 36415 COLL VENOUS BLD VENIPUNCTURE: CPT

## 2020-05-18 RX ORDER — QUETIAPINE FUMARATE 25 MG/1
25 TABLET, FILM COATED ORAL 2 TIMES DAILY
Qty: 60 TAB | Refills: 0 | Status: SHIPPED | OUTPATIENT
Start: 2020-05-18 | End: 2020-06-17

## 2020-05-18 RX ADMIN — QUETIAPINE FUMARATE 25 MG: 25 TABLET ORAL at 08:47

## 2020-05-18 RX ADMIN — Medication 10 ML: at 00:26

## 2020-05-18 RX ADMIN — ASPIRIN 81 MG 81 MG: 81 TABLET ORAL at 08:47

## 2020-05-18 RX ADMIN — ATORVASTATIN CALCIUM 80 MG: 40 TABLET, FILM COATED ORAL at 08:48

## 2020-05-18 RX ADMIN — Medication 10 ML: at 13:34

## 2020-05-18 RX ADMIN — Medication 10 ML: at 08:48

## 2020-05-18 RX ADMIN — EZETIMIBE 10 MG: 10 TABLET ORAL at 08:47

## 2020-05-18 RX ADMIN — METOPROLOL TARTRATE 25 MG: 25 TABLET, FILM COATED ORAL at 08:47

## 2020-05-18 NOTE — PROGRESS NOTES
Bedside and Verbal shift change report given to [address] Net[address] Dave (oncoming nurse) by José Raygoza RN (offgoing nurse). Report included the following information SBAR, MAR, Recent Results and Cardiac Rhythm NSR.

## 2020-05-18 NOTE — PROGRESS NOTES
1315 - Allscripts Alert: YES response from [facility] re: Referral [id number] for patient in [id number]: Yes, willing to accept patient 3PM TODAY. Update to 3N Nurse - Colleen Carty RN , patient at bedside and patients daughter via phone conversation. [facility] form on chart . CM will continue to follow and assist with EDIN needs as they arise. Available on [employer]. [address] Balta Santa  MSN, [address], RN, CRM - [phone].    1200 - Orders entered to arrange for stretcher transport home. Demographic information verified as correct. Patients mom had no additional questions or concerns at this time. Referral created via Allscripts to [facility]  - (m) [phone]. Update to 3N Nurse - Colleen Carty RN and patients sister - Tarun Krishnan - [phone] . CM will continue to follow. 100 ShangPinmichelle Santa MSN, [address], RN, CRM - [phone].    7273 - CM received VM from daughter - Tarun Krishnan - [phone] for update on her mothers discharge time. -- Family has setup 24/7 nursing care with [address] [facility] to meet their mom at her apartment upon discharge. Update to 3N Nurse - Colleen Carty RN. CM will continue to follow and assist with EDIN needs as they arise. Available on [employer]. 100 Balta ABDALLA, [address], RN, [address] [place] [address] - [phone].

## 2020-05-18 NOTE — PROGRESS NOTES
Problem: Falls - Risk of  Goal: *Absence of Falls  Description: Document Beny Virgen Fall Risk and appropriate interventions in the flowsheet.   Outcome: Progressing Towards Goal  Note: Fall Risk Interventions:  Mobility Interventions: Patient to call before getting OOB    Mentation Interventions: Bed/chair exit alarm, Adequate sleep, hydration, pain control, Door open when patient unattended    Medication Interventions: Patient to call before getting OOB    Elimination Interventions: Bed/chair exit alarm              Problem: Patient Education: Go to Patient Education Activity  Goal: Patient/Family Education  Outcome: Progressing Towards Goal     Problem: General Medical Care Plan  Goal: *Absence of infection signs and symptoms  Outcome: Progressing Towards Goal  Goal: *Optimal pain control at patient's stated goal  Outcome: Progressing Towards Goal  Goal: *Skin integrity maintained  Outcome: Progressing Towards Goal  Goal: *Optimize nutritional status  Outcome: Progressing Towards Goal  Goal: *Progressive mobility and function (eg: ADL's)  Outcome: Progressing Towards Goal

## 2020-05-18 NOTE — DISCHARGE INSTRUCTIONS
DISCHARGE SUMMARY from Nurse    PATIENT INSTRUCTIONS:    After general anesthesia or intravenous sedation, for 24 hours or while taking prescription Narcotics:  · Limit your activities  · Do not drive and operate hazardous machinery  · Do not make important personal or business decisions  · Do  not drink alcoholic beverages  · If you have not urinated within 8 hours after discharge, please contact your surgeon on call. Report the following to your surgeon:  · Excessive pain, swelling, redness or odor of or around the surgical area  · Temperature over 100.5  · Nausea and vomiting lasting longer than 4 hours or if unable to take medications  · Any signs of decreased circulation or nerve impairment to extremity: change in color, persistent  numbness, tingling, coldness or increase pain  · Any questions    What to do at Home:  Recommended activity: Activity as tolerated, Activity as tolerated and no driving for today and Ambulate in house,     If you experience any of the following symptoms , please follow up with . *  Please give a list of your current medications to your Primary Care Provider. *  Please update this list whenever your medications are discontinued, doses are      changed, or new medications (including over-the-counter products) are added. *  Please carry medication information at all times in case of emergency situations. These are general instructions for a healthy lifestyle:    No smoking/ No tobacco products/ Avoid exposure to second hand smoke  Surgeon General's Warning:  Quitting smoking now greatly reduces serious risk to your health.     Obesity, smoking, and sedentary lifestyle greatly increases your risk for illness    A healthy diet, regular physical exercise & weight monitoring are important for maintaining a healthy lifestyle    You may be retaining fluid if you have a history of heart failure or if you experience any of the following symptoms:  Weight gain of 3 pounds or more overnight or 5 pounds in a week, increased swelling in our hands or feet or shortness of breath while lying flat in bed. Please call your doctor as soon as you notice any of these symptoms; do not wait until your next office visit. The discharge information has been reviewed with the patient. The patient verbalized understanding. Discharge medications reviewed with the patient and appropriate educational materials and side effects teaching were provided. ___________________________________________________________________________________________________________________________________   Discharge Instructions       PATIENT ID: Aysha Rae  MRN: [id number]   YOB: 1935    DATE OF ADMISSION: 5/11/2020  2:46 PM    DATE OF DISCHARGE: 5/18/2020    PRIMARY CARE PROVIDER: Elizabeth Piedra MD     ATTENDING PHYSICIAN: Som Morfin MD  DISCHARGING PROVIDER: Balbir Soto MD    To contact this individual call [phone] and ask the  to page. If unavailable ask to be transferred the Adult Hospitalist Department. DISCHARGE DIAGNOSES UTI- Acute metabolic Encephalopathy    CONSULTATIONS: IP CONSULT TO CARDIOLOGY    PROCEDURES/SURGERIES: * No surgery found *    FOLLOW UP APPOINTMENTS:   Follow-up Information     Follow up With Specialties Details Why Contact Info    Elizabeth Piedra MD Family Practice In 1 week  [address]  [phone]             ADDITIONAL CARE RECOMMENDATIONS: follow up with PCP    DIET: Resume previous diet    DISCHARGE MEDICATIONS:  Quetiapine     · It is important that you take the medication exactly as they are prescribed. · Keep your medication in the bottles provided by the pharmacist and keep a list of the medication names, dosages, and times to be taken in your wallet. · Do not take other medications without consulting your doctor.      NOTIFY YOUR PHYSICIAN FOR ANY OF THE FOLLOWING:   Fever over 101 degrees for 24 hours. Chest pain, shortness of breath, fever, chills, nausea, vomiting, diarrhea, change in mentation, falling, weakness, bleeding. Severe pain or pain not relieved by medications. Or, any other signs or symptoms that you may have questions about. It was our pleasure to help take care of you and we hope you get well very soon.     DISPOSITION:    Home With:   OT  PT  HH  RN       SNF/Inpatient Rehab/LTAC   x Independent/assisted living    Hospice    Other:     CDMP Checked:   Yes x     PROBLEM LIST Updated:  Yes x     Signed:   Balbir Soto MD  5/18/2020  7:36 AM

## 2020-05-18 NOTE — DISCHARGE SUMMARY
Discharge Summary       PATIENT ID: Maria De Jesus Devi  MRN: [id number]   YOB: 1935    DATE OF ADMISSION: 5/11/2020  2:46 PM    DATE OF DISCHARGE: 5/18/2020   PRIMARY CARE PROVIDER: Aurelia Harper MD     ATTENDING PHYSICIAN: Marcela Pickens MD  DISCHARGING PROVIDER: Marcela Pickens MD    To contact this individual call [phone] and ask the  to page. If unavailable ask to be transferred the Adult Hospitalist Department. CONSULTATIONS: IP CONSULT TO CARDIOLOGY    PROCEDURES/SURGERIES: * No surgery found *    ADMITTING DIAGNOSES & HOSPITAL COURSE:   Treated for UTI, had Acute metabolic Encephalopathy. Required sitter/ sedation, gradually improved close to baseline prior to dc. F/u with PCP    Risk of Re-Admission: mod  DISCHARGE DIAGNOSES / PLAN:      UTI: - Treated- Urine cultures E.coli  Acute infectious encephalopathy POA- due to UTI- improving  -given Geodon in ER due to agitation-Head CT negative for acute processes  PRN xanax ordered- Negative COVID testing  - Abx for UTI given IM due to loss of IV and refusal to take po meds  - PT/OT eval- supervision on dc. - start serequel- refused pills, try Haldol PRN IM inj, sitter - patient made significant improved in mental status, close to baseline prior to dc.  This could be early dementia, if requires continuous supervision in facility, family to consider memory unit.     Elevated troponin with hx CAD  - troponin minimally elevated, no chest pain, stable BP and HR  - continue home meds aspirin, metoprolol, lipitor   - cardio following-Echo: preserved ef, no RWMA, EKG reviewed and unremarkable     Elevated AST -Suspect due to dehydration -resolved  HTN -continue metoprolol and monitor BP  HLD- resumed statin med on admission     FOLLOW UP APPOINTMENTS:    Follow-up Information     Follow up With Specialties Details Why Contact Info    Aurelia Harper MD Family Practice In 1 week  [address] [address]  [phone]           ADDITIONAL CARE RECOMMENDATIONS:  Follow up with PCP    DIET: Resume previous diet    ACTIVITY: Activity as tolerated    DISCHARGE MEDICATIONS:  Current Discharge Medication List      START taking these medications    Details   QUEtiapine (SEROquel) 25 mg tablet Take 1 Tab by mouth two (2) times a day for 30 days. Qty: 60 Tab, Refills: 0         CONTINUE these medications which have NOT CHANGED    Details   alendronate (FOSAMAX) 70 mg tablet Take 70 mg by mouth two (2) times a week. metoprolol tartrate (LOPRESSOR) 25 mg tablet Take 25 mg by mouth two (2) times a day.      ezetimibe (ZETIA) 10 mg tablet Take 10 mg by mouth daily. valsartan-hydroCHLOROthiazide (DIOVAN-HCT) 80-12.5 mg per tablet Take 0.5 Tabs by mouth daily. ALPRAZolam (XANAX) 0.25 mg tablet Take 0.125 mg by mouth daily as needed for Anxiety. mirtazapine (REMERON) 7.5 mg tablet Take 7.5 mg by mouth nightly. cholecalciferol, vitamin D3, (Vitamin D3) 50 mcg (2,000 unit) tab Take 2,000 Units by mouth daily. aspirin 81 mg chewable tablet Take 81 mg by mouth daily. docusate sodium (Colace) 100 mg capsule Take 100 mg by mouth daily as needed for Constipation. atorvastatin (LIPITOR) 80 mg tablet Take 80 mg by mouth nightly. peg 400-propylene glycol (Systane, propylene glycol,) 0.4-0.3 % drop Administer 1 Drop to both eyes as needed. co-enzyme Q-10 (CO Q-10) 100 mg capsule Take 100 mg by mouth daily. STOP taking these medications       escitalopram oxalate (LEXAPRO) 10 mg tablet Comments:   Reason for Stopping:             NOTIFY YOUR PHYSICIAN FOR ANY OF THE FOLLOWING:   Fever over 101 degrees for 24 hours. Chest pain, shortness of breath, fever, chills, nausea, vomiting, diarrhea, change in mentation, falling, weakness, bleeding. Severe pain or pain not relieved by medications. Or, any other signs or symptoms that you may have questions about.     DISPOSITION:    Home With:   OT  PT  HH  RN       Long term SNF/Inpatient Rehab   x Independent/assisted living    Hospice    Other:     PATIENT CONDITION AT DISCHARGE:     Functional status    Poor     Deconditioned     Independent      Cognition     Lucid     Forgetful     Dementia      Catheters/lines (plus indication)    Perez     PICC     PEG     None      Code status     Full code     DNR      PHYSICAL EXAMINATION AT DISCHARGE:  Patient seen and examined at bedside, Condition stable, explained discharge and follow up plans. /73 (BP 1 Location: Left arm, BP Patient Position: Lying right side)   Pulse 73   Temp 97.2 °F (36.2 °C)   Resp 18   Ht 5' 4\" (1.626 m)   Wt 86.4 kg (190 lb 7.6 oz)   SpO2 96%   BMI 32.70 kg/m²   General:  Alert, oriented, No acute distress, obese  Resp:  No accessory muscle use, Good AE. Neuro:  Grossly normal, no focal neuro deficits, follows commands     CHRONIC MEDICAL DIAGNOSES:  Problem List as of 5/18/2020 Date Reviewed: 5/12/2020          Codes Class Noted - Resolved    UTI (urinary tract infection) ICD-10-CM: N39.0  ICD-9-CM: 599.0  5/13/2020 - Present        Elevated troponin ICD-10-CM: R79.89  ICD-9-CM: 790.6  5/11/2020 - Present        Hypertension ICD-10-CM: I10  ICD-9-CM: 401.9  Unknown - Present        CAD (coronary artery disease) ICD-10-CM: I25.10  ICD-9-CM: 414.00  Unknown - Present        Elevated serum creatinine ICD-10-CM: R79.89  ICD-9-CM: 790.99  Unknown - Present        Elevated AST (SGOT) ICD-10-CM: R74.0  ICD-9-CM: 790.4  Unknown - Present              35 minutes were spent with the patient on counseling and coordination of care.     Signed:   Priscilla Vivar MD  5/18/2020  7:36 AM

## 2020-05-28 ENCOUNTER — TELEPHONE (OUTPATIENT)
Dept: NEUROLOGY | Age: 85
End: 2020-05-28

## 2020-05-28 NOTE — TELEPHONE ENCOUNTER
----- Message from Al Yepez sent at 5/28/2020 12:55 PM EDT -----  Regarding: Dr. Mainor Bennett  General Message/Vendor Calls    Caller's first and last name:[facility]LEEN [facility] (CHILD)      Reason for call: Requesting an New Pt appt for psychological evaluation      Callback required yes/no and why:yes      Best contact number(s):[phone]      Details to clarify the request:      Al Yepez

## 2020-05-28 NOTE — TELEPHONE ENCOUNTER
Called daughter and she provided me referring dr info. Called referring office and requested notes, will call daughter back once rcvd.

## 2020-06-01 ENCOUNTER — TELEPHONE (OUTPATIENT)
Dept: NEUROLOGY | Age: 85
End: 2020-06-01

## 2020-06-01 NOTE — TELEPHONE ENCOUNTER
----- Message from Galileo Junior sent at 6/1/2020  9:16 AM EDT -----  Regarding: dr ramey/ telephone  General Message/Vendor Calls    Caller's first and last name: [facility] (CHILD)      Reason for call: to schedule an appt      Callback required yes/no and why: yes      Best contact number(s): [phone]      Details to clarify the request:      Galileo Luceor6

## 2020-06-16 ENCOUNTER — TELEPHONE (OUTPATIENT)
Dept: NEUROLOGY | Age: 85
End: 2020-06-16

## 2020-06-16 NOTE — TELEPHONE ENCOUNTER
----- Message from Tunde Osborne sent at 6/16/2020  8:37 AM EDT -----  Regarding: Dr. Bhavani Chi  General Message/Vendor Calls    Caller's first and last name:Laurence Chery(daughter)      Reason for call:nurse call back      Callback required yes/no and why:yes      Best contact number(s): [phone]      Details to clarify the request:Pt's daughter would like to know if a fax was received on yesterday regarding new pt paperwork.       Tunde Osborne

## 2020-06-22 ENCOUNTER — VIRTUAL VISIT (OUTPATIENT)
Dept: NEUROLOGY | Age: 85
End: 2020-06-22

## 2020-06-22 DIAGNOSIS — F03.90 MAJOR NEUROCOGNITIVE DISORDER DUE TO ALZHEIMER'S DISEASE, PROBABLE, WITHOUT BEHAVIORAL DISTURBANCE: ICD-10-CM

## 2020-06-22 DIAGNOSIS — F33.1 MODERATE EPISODE OF RECURRENT MAJOR DEPRESSIVE DISORDER (HCC): Primary | ICD-10-CM

## 2020-06-22 NOTE — PROGRESS NOTES
This note will not be viewable in [address] Pursuant to the emergency declaration under the [facility] waiver authority and the [employer] Act, this Virtual Visit was conducted, with appropriate consent obtained, to reduce the patient's risk of exposure to COVID-19 and provide continuity of care   Services were provided in this manner to substitute for in-person clinic visit. The originating site is the patient's home and the distance site is [facility] at [facility]. These types of teleneuropsychology/telehealth/telemedicine visits were authorized by the President of the [employer], though I/we cannot guarantee what a third party payor will do reimbursement/coverage wise. I indicated that I would evaluate the patient and recommend diagnostics and treatment based on my assessment and impressions, and that our sessions are not being recorded and that personal health information is protected to the best of our abilities. [facility] at [address]    Office:  [phone]  Fax: [phone]                 Initial Office Exam    Patient Name: Dina Godinez  Age: 82 y.o. Gender: female   Occupation: Retired  Handedness: right handed   Presenting Concern: had concerns including Dementia. Primary Care Physician: Elizabeth Piedra MD  Referring Provider: Same as above    REASON FOR REFERRAL:  This comprehensive and medically necessary neuropsychological assessment was requested to assist with a differential diagnosis of dementia.   The use and purpose of this examination, as well as the extent and limitations of confidentiality, were explained prior to obtaining permission to participate. Instructions were provided regarding the necessity to put forth optimal effort and answer questions truthfully in order to obtain reliable and accurate test results. PERTINENT HISTORY:  Ms. Tian Majano presented for a neuropsychological assessment at the recommendation of her treating physician secondary to complaints of cognitive changes. Her daughter assisted her during the interview. She has reported symptoms that include memory loss, increased activity, decreased activity, not keeping up with her bills, and probably not managing her medication well according to her daughter. She reports an increase in fatigue and confusion in the past month with the onset of a UTI. It is unclear how long she has been suffering from memory concerns, but she has been living in a independent living facility for adults. From a brief review of her medical and personal history there has not been any other significant neurological injury or illness noted or reported. She did report experiencing depression or anxiety in the past  with two prior (2008 & 2012) hospitalizations for depression, each for 10-14 days. .      Ms. Tian Majano does not  report any problems at birth or difficulties meeting developmental milestones. She reports that she had an adequate level of family support and was not subject to trauma or abuse as a child. Ms. Tian Majano does not  report being retain in school or receiving special assistance in any of she classes or subjects. Ms. Tian Majano completed 14 years of education. Ms. Tian Majano does not  exercise on a regular basis and does not  maintain a balanced diet. She does  report problems with sleep and does  complain of pain. She does not  participate in mentally stimulating activities. Ms. Tian Majano does not  have concerns regarding prescription medications, family members, place of residence, or financial stressors.   Ms. Tian Majano indicated that she is independent in her instrumental activities of daily living, including shopping, meal preparation, housekeeping, doing laundry, managing medications, and finances. No current outpatient medications on file. No current facility-administered medications for this visit. No past medical history on file. No flowsheet data found. No data recorded    No past surgical history on file. Social History     Socioeconomic History    Marital status: SINGLE     Spouse name: Not on file    Number of children: Not on file    Years of education: Not on file    Highest education level: Not on file       No family history on file. CT Results (most recent):  No results found for this or any previous visit. MRI Results (most recent):  No results found for this or any previous visit. MENTAL STATUS:    Orientation:  Oriented to year season month and day of week   Eye Contact:  Appropriate   Motor Behavior:   Ambulates independently with rolling walker   Speech:   Decreased fluency, word finding difficulties, intelligible   Thought Process:  Slow, clear, coherent   Thought Content:  No evidence of hallucinations or delusions   Suicidal ideations:  Denies   Mood:   Dysphoric   Affect:   Normal   Concentration:   Mildly decreased   Abstraction:   Mildly decreased   Insight:   Limited     On the Modified Mini-Mental Status Exam: 80/100 (2 %ile) Moderately impaired      DIAGNOSTIC IMPRESSIONS:    ICD-10-CM ICD-9-CM    1. Moderate episode of recurrent major depressive disorder (HCC) F33.1 296.32    2. Major neurocognitive disorder due to Alzheimer's disease, probable, without behavioral disturbance ([facility] [address] F01.50 331.0      294.10              PLAN:  1. Complete a comprehensive neuropsychological assessment to provide a differential diagnosis of presenting concerns as well as to assist with disposition and treatment planning as appropriate. 2. Consider compensatory and remedial cognitive training.   3. Consider transfer from independent living to assisted living or full time assistance in the home. 4. Consider referral for elder health nurse to provide an in-home functional assessment. 5. Consider placement issues to provide greater structure and supervision to ensure safety, health and well-being. [address] x 1 Review of records. Face to face interview w/ patient. Determine test protocol: 60 minutes. Total 1 unit        Marcia Salgado, PhD, ABPP, LCP  Licensed Clinical Psychologist/ Neuropsychologist        This note will not be viewable in [address]

## 2020-06-29 ENCOUNTER — OFFICE VISIT (OUTPATIENT)
Dept: NEUROLOGY | Age: 85
End: 2020-06-29

## 2020-06-29 DIAGNOSIS — F03.90 MAJOR NEUROCOGNITIVE DISORDER DUE TO ALZHEIMER'S DISEASE, PROBABLE, WITHOUT BEHAVIORAL DISTURBANCE: Primary | ICD-10-CM

## 2020-06-29 NOTE — PROGRESS NOTES
This note will not be viewable in [address] [facility] at [address]    Office:  [phone]  Fax: [phone]                                            Neuropsychological Evaluation Report    Patient Name: John Braun  Age: 82 y.o. Gender: female   Occupation: Retired  Handedness: right handed   Presenting Concern: had concerns including Dementia. Primary Care Physician: Rober Boyd MD  Referring Provider: Same as above     PATIENT HISTORY (OBTAINED DURING INITIAL CLINICAL EVALUATION):    REASON FOR REFERRAL:  This comprehensive and medically necessary neuropsychological assessment was requested to assist with a differential diagnosis of dementia. The use and purpose of this examination, as well as the extent and limitations of confidentiality, were explained prior to obtaining permission to participate. Instructions were provided regarding the necessity to put forth optimal effort and answer questions truthfully in order to obtain reliable and accurate test results.     PERTINENT HISTORY:  Ms. Luna Pillai presented for a neuropsychological assessment at the recommendation of her treating physician secondary to complaints of cognitive changes. Her daughter assisted her during the interview. She has reported symptoms that include memory loss, increased activity, decreased activity, not keeping up with her bills, and probably not managing her medication well according to her daughter. She reports an increase in fatigue and confusion in the past month with the onset of a UTI. It is unclear how long she has been suffering from memory concerns, but she has been living in a independent living facility for adults. From a brief review of her medical and personal history there has not been any other significant neurological injury or illness noted or reported.   She did report experiencing depression or anxiety in the past  with two prior (2008 & 2012) hospitalizations for depression, each for 10-14 days. .       Ms. Gerald Gardner does not  report any problems at birth or difficulties meeting developmental milestones. She reports that she had an adequate level of family support and was not subject to trauma or abuse as a child. Ms. Gerald Gardner does not  report being retain in school or receiving special assistance in any of she classes or subjects. Ms. Gerald Gardner completed 14 years of education.     Ms. Gerald Gardner does not  exercise on a regular basis and does not  maintain a balanced diet. She does  report problems with sleep and does  complain of pain. She does not  participate in mentally stimulating activities. Ms. Gerald Gardner does not  have concerns regarding prescription medications, family members, place of residence, or financial stressors. Ms. Gerald Gardner indicated that she is independent in her instrumental activities of daily living, including shopping, meal preparation, housekeeping, doing laundry, managing medications, and finances. METHODS OF ASSESSMENT (Current Evaluation):  Clinician Administered:  Clinical Interview  Review of Medical Records  Clock Drawing Task  Modified Mini-Mental Status Exam (3MS)  Test of Premorbid Functioning  Olson Anxiety Inventory        Olson Depression [facility] Anxiety and Depression Scale  Revised Memory and Behavior Checklist    Technician Administered:        DKEFS: Design Fluency   Gerald Dementia Rating Scale-2  NAB Judgment  RBANS-B  Reliable Digit Span  Test of Memory Malingering  Test of Premorbid Functioning   Test of Practical Judgment (9-Item)  [place] Functional Living Scale  Trail Making Test      TEST OBSERVATIONS:  Ms. Gerald Gardner arrived promptly for the testing session. Dress and grooming were appropriate; physical presentation was unchanged from that observed during the clinical interview. Speech was fluent, intelligible, and goal-directed.   Affect was congruent with comfortable appearance/cooperative the euthymic mood conveyed. Ms. Manny Otoole was adequately cooperative and appeared to put forth good effort throughout this examination. Rapport with the examiner was adequately established and maintained. Minimal prompting was required. Comprehension of test instructions was not problematic. Performance motivation was objectively measured by three instruments (RBANS EI, Reliable Digit Span, TOMM), and Ms. Conde produced a normal score on these measures. Accordingly, test findings below do not appear to be the product of disingenuous effort. Given the above observations, plus comments contained in the Mental Status section, the results of this examination are regarded as reasonably reliable and valid. TEST RESULTS:  Quantitative test results are derived from comparisons to age and education corrected cohort normative data, where applicable. Percentiles are included in these instances. Qualitative test results are determined using clinical observations. General Orientation and Awareness:       Orientation to person, year, month, day of month, day of week, state, town, and circumstance.    Awareness of deficits: Aware                   Cognitive performance validity testing: Valid  3MS - 80/100 (8%ile) Mildly Impaired      Attention/Concentration:      Classification:       Coding (3 percentile)           Moderately Impaired  Simple visuomotor tracking (6 percentile)               Mildly Impaired  Digits forward (50 percentile)                 Average  Digits backward (50 percentile)                 Average    Visuospatial and Constructional Praxis:     Figure copy (12 percentile)                                       Low Average  Line orientation (3 percentile)                                                  Moderately Impaired     Language:         Picture naming (82 percentile)                               High Average  Semantic fluency (18 percentile)                    Low Average    Memory and Learning:       Immediate word list learning (3 percentile)               Moderately Impaired  Delayed word list recall (0.2 percentile)                  Severely Impaired  Delayed word list recognition (<0.1 percentile)               Severely Impaired  Immediate story memory (5 percentile)                  Mildly Impaired  Delayed story recall (10 percentile)                Low Average  Delayed figure recall (0.4 percentile)                Severely Impaired    Cognitive Tests of Executive Functioning:     Ability to think flexibly, Trail Making B (0.3 percentile)              Severely Impaired  Design fluency  (36 percentile)         Average  Simple judgment in daily decision making (27 percentile)             Average  Complex judgment in daily decision making (2 percentile)              Moderately Impaired    Gerald Dementia Rating Scale - 2:        Scale                           SS                   Percentile  Attention                             12                    81                                          High Average  Initiation/Perseveration      7                      18                                           Low Average  Construction                      10                     59                                         Average  Conceptualization              11                    71                                          Average  Memory                              2                    <1                                           Severely Impaired  Total                                   6                      9                                            Low Average     Adaptive Behavioral Measure of Daily Functioning:   Time:    75 %ile   Money/calculations:   25 %ile  Communication:    9 %ile   Memory:     9 %ile    Total:     T= 32 (4%ile)    Intellectual and Basic Cognitive Functioning (WAIS-IV):  ACS Test of pre-morbid functioning reading recognition lower limit estimated WAIS-IV FSIQ score:       Estimated full scale IQ:            82 percentile     High Average    Emotional Functioning:     HADS:     Depression = 03   Normal Range         Anxiety = 05         Normal Range      BDI: Depression = 01       Normal Range   ANDREE:      Anxiety =    04          Normal Range      IMPRESSIONS:  Ms. Saud Quiroga was seen for a comprehensive neuropsychological evaluation and administered a battery of measures assessing the 5 neurocognitive domains of attention, visual-spatial, language, memory, and executive functioning. She was initially administered a measure of basic cognitive ability (MDRS) and her performance yielded a score in the low average range. Her only score falling in the impaired range was memory and that was severely impaired. She was then administered a variety of measures slightly more demanding and her performance yielded an overall score in the mildly impaired range. Her performance across the 5 neurocognitive domains varied from severely impaired to average. Her performance on measures of language functioning were overall in the average range, with her confrontational naming ability (high average) significantly better than her semantic fluency (low average). Her overall attention domain score was in the low average range, with her performance on measures of auditory attention and working memory in the average range, but measures of processing speed performed in the mild to moderately impaired range. Impaired processing of information will have a direct negative impact on her memory performance as well as her performance on other measures with a time/speed demand. Her performance on the domain of Executive functioning yielded a score in the mildly impaired range.  More specifically, performance on measures of visual fluency and simple judgment in decision making fell in the average range, but her performance on measures of cognitive flexibility and complex judgment was severely and moderately impaired. It should be noted that her cognitive flexibility performance was partially based on speed of processing, but her complex judgment was not. Her weakest performance was observed on the domains of visuospatial and memory functioning. She performed in the low average range on a measure of constructional praxis, but was moderately impaired in her judgment of line orientation. Her overall performance yielded a score in the moderately impaired range. Her memory performance was severely impaired overall, with moderately impaired verbal acquisition, and profoundly impaired free recall. On a measure of basic adaptive living skills, her overall performance was in the moderately impaired range. In summary, Ms. Murcia overall performance was significantly below her estimated premorbid level of functioning. This is especially true with regards to her visuospatial and memory functioning. There does not appear to be a significant level of emotional distress at this time. DIAGNOSTIC IMPRESSIONS:   1. Major Neurocognitive disorder due to Alzheimer's disease, probable, without behavioral disturbance. RECOMMENDATIONS:   1. Findings should be reviewed with Ms. Conde  to insure her understanding and discuss the potential implications. 2. Emphasis should be placed on Ms. Veronica Brady  obtaining good sleep hygiene and maintaining adequate physical exercise to promote good brain health. 3. Ms. Veronica Brady  may benefit from a referral to psychotherapy to address anxiety and work on adequate stress management skills. 4. Ms. Veronica Brady  is encouraged to seek out various forms of mental stimulation that would help to \"exercise\" her brain. This might include learning a new skill or hobby, socializing, attending lectures, or evening reading or listening to podcasts or videos on topics of her interest.  5. Ms. Veronica Brady should consider in home supervision to assist her with her ADL's housekeeping and to insure her safety.   6. Ms. Amaya should discontinue her driving, if she continues to drive at this time. Thank you for allowing me the opportunity to assist you in Mr. Zi pedraza. Please do not hesitate to contact me should you have additional questions that I may not have addressed. Anton Preston, PhD, [facility]  Neuropsychologist    87471*1 Neuropsych testing/data gathering by Neuropsychologist (35 additional minutes, see above)      [phone] x 1  96139 x 6 Test Administration/Data Gathering By Technician: (3.5 hours). 92399 x 1 (first 30 minutes), 69982 x 7 (each additional 30 minutes)     96132 x 1  96133 x 1 Testing Evaluation Services by Neuropsychologist (1 hour, 50 minutes) 96132 x 1 (first hour), 96133 x 1 (50 minutes)     Definitions:       [id number]/06816:  Neurobehavioral Status Exam, Clinical interview. Clinical assessment of thinking, reasoning and judgment, by neuropsychologist, both face to face time with patient and time interpreting those test results and reporting, first and subsequent hours)     83303/39934: Neuropsychological Test Administration by Technician/Psychometrist, first 30 minutes and each additional 30 minutes. The above includes: Record review. Review of history provided by patient. Review of collaborative information. Testing by Clinician. Review of raw data. Scoring. Report writing of individual tests administered by Clinician. Integration of individual tests administered by psychometrist with NSE/testing by clinician, review of records/history/collaborative information, case Conceptualization, treatment planning, clinical decision making, report writing, coordination Of Care.  Psychometry test codes as time spent by psychometrist administering and scoring neurocognitive/psychological tests under supervision of neuropsychologist.  Integral services including scoring of raw data, data interpretation, case conceptualization, report writing etcetera were initiated after the patient finished testing/raw data collected and was completed on the date the report was signed. This note will not be viewable in [address]

## 2020-07-16 ENCOUNTER — TELEPHONE (OUTPATIENT)
Dept: NEUROLOGY | Age: 85
End: 2020-07-16

## 2020-07-16 NOTE — TELEPHONE ENCOUNTER
----- Message from Redd Michele sent at 7/16/2020 12:19 PM EDT -----  Regarding: /Telephone  General Message/Vendor Calls    Caller's first and last [facility]      Reason for call:  Referral for a therapist     Callback required yes/no and why:yes      Best contact number(s):[phone]      Details to clarify the request:Pt daughter called requesting a call back for a referral for a therapist for the pt .       Redd Michele

## 2022-10-30 NOTE — ROUTINE PROCESS
TRANSFER - OUT REPORT: 
 
Verbal report given to RAE Solano(name) on Dimitrios Welsh  being transferred to [facility]) for routine progression of care Report consisted of patients Situation, Background, Assessment and  
Recommendations(SBAR). Information from the following report(s) SBAR, ED Summary, MAR, Recent Results and Cardiac Rhythm NSR was reviewed with the receiving nurse. Lines:    
 
Opportunity for questions and clarification was provided. Patient transported with: 
 Monitor Registered Nurse Labs/Imaging Studies